# Patient Record
Sex: FEMALE | Race: ASIAN | NOT HISPANIC OR LATINO | URBAN - METROPOLITAN AREA
[De-identification: names, ages, dates, MRNs, and addresses within clinical notes are randomized per-mention and may not be internally consistent; named-entity substitution may affect disease eponyms.]

---

## 2023-02-06 ENCOUNTER — INPATIENT (INPATIENT)
Facility: HOSPITAL | Age: 73
LOS: 2 days | Discharge: HOME | End: 2023-02-09
Attending: INTERNAL MEDICINE | Admitting: INTERNAL MEDICINE
Payer: MEDICAID

## 2023-02-06 VITALS
SYSTOLIC BLOOD PRESSURE: 184 MMHG | RESPIRATION RATE: 18 BRPM | DIASTOLIC BLOOD PRESSURE: 78 MMHG | OXYGEN SATURATION: 95 % | TEMPERATURE: 98 F | WEIGHT: 164.91 LBS | HEART RATE: 87 BPM | HEIGHT: 66 IN

## 2023-02-06 PROCEDURE — 99285 EMERGENCY DEPT VISIT HI MDM: CPT

## 2023-02-06 NOTE — ED ADULT TRIAGE NOTE - CHIEF COMPLAINT QUOTE
Encounter Date: 1/16/2020       History     Chief Complaint   Patient presents with    Chest Pain     7-year-old male who has a history of basal cell carcinoma, kidney stones, recent MRA say abscesses to his back which is been ID'd , hypertension, presents emergency room with a history that today while seated at home he began having severe burning epigastric pain [place] to the left lateral aspect of a ventral hernia.  Patient states he just recently got out of the hospital 2 days ago for his MRSA abscesses.  Currently is taking doxycycline.  The patient denies any fever or chills.  He has had some nausea without vomiting. No known history of heart disease.  The patient states that EMS gave him nitroglycerin with no benefit.  He readily admits that the pain is much worse with certain movement as well as deep inspiration.  No history direct trauma changes in his normal physical activity.  Appetite has been normal.        Review of patient's allergies indicates:   Allergen Reactions    Morphine Nausea Only     Ok with nausea med.     Past Medical History:   Diagnosis Date    Arthritis     Asbestos exposure     SOB     Back pain     Basal cell carcinoma     Bulging discs     lumbar    Hypertension     Kidney stone     Prostatitis     Sepsis due to methicillin resistant Staphylococcus aureus (MRSA) 06/2019    Due to large right upper arm abscess    Wears glasses      Past Surgical History:   Procedure Laterality Date    APPENDECTOMY      HAND SURGERY      rt hand    INCISION AND DRAINAGE Bilateral 06/2019    Right upper arm and left upper arm abscesses    JOINT REPLACEMENT      right knee    KNEE LIGAMENT RECONSTRUCTION      left knee    LITHOTRIPSY      TONSILLECTOMY, ADENOIDECTOMY, BILATERAL MYRINGOTOMY AND TUBES      TOTAL KNEE ARTHROPLASTY  1971    rt knee    URETERAL STENT PLACEMENT       Family History   Problem Relation Age of Onset    Cancer Mother     Stroke Father     Heart disease Father      Collagen disease Neg Hx     Melanoma Neg Hx     Psoriasis Neg Hx     Lupus Neg Hx     Eczema Neg Hx      Social History     Tobacco Use    Smoking status: Never Smoker    Smokeless tobacco: Never Used   Substance Use Topics    Alcohol use: No    Drug use: No     Review of Systems   Constitutional: Negative for activity change, chills, diaphoresis and fever.   HENT: Negative.  Negative for congestion, ear pain, rhinorrhea, sinus pain and sore throat.    Eyes: Negative.  Negative for pain.   Respiratory: Positive for cough. Negative for chest tightness, shortness of breath and wheezing.    Cardiovascular: Negative for chest pain, palpitations and leg swelling.   Gastrointestinal: Positive for abdominal pain. Negative for constipation, diarrhea, nausea and vomiting.   Genitourinary: Negative for difficulty urinating, flank pain, frequency and hematuria.   Musculoskeletal: Positive for back pain.   Skin: Positive for wound. Negative for pallor and rash.   Neurological: Negative for headaches.   All other systems reviewed and are negative.      Physical Exam     Initial Vitals [01/16/20 1432]   BP Pulse Resp Temp SpO2   (!) 141/89 99 (!) 24 99.3 °F (37.4 °C) 98 %      MAP       --         Physical Exam    Constitutional: He appears well-developed and well-nourished. He is not diaphoretic. No distress.   Uncomfortable   HENT:   Head: Normocephalic and atraumatic.   Right Ear: External ear normal.   Left Ear: External ear normal.   Nose: Nose normal.   Mouth/Throat: Oropharynx is clear and moist.   Eyes: Conjunctivae and EOM are normal. Pupils are equal, round, and reactive to light. Right eye exhibits no discharge. Left eye exhibits no discharge. No scleral icterus.   Neck: Normal range of motion. Neck supple. No tracheal deviation present. No JVD present.   Cardiovascular: Normal rate, regular rhythm, normal heart sounds and intact distal pulses. Exam reveals no gallop and no friction rub.    No murmur  heard.  Pulmonary/Chest: Breath sounds normal. No respiratory distress. He has no wheezes. He has no rhonchi. He has no rales. He exhibits no tenderness.   Abdominal: Soft. Bowel sounds are normal. He exhibits no distension and no mass. There is tenderness. There is no rebound and no guarding.   The patient has diastasis recti and has point tenderness to palpation over the proximal portion just inferior to the ribs over the left lateral aspect of the hernia.   Genitourinary: Penis normal.   Musculoskeletal: Normal range of motion. He exhibits no edema or tenderness.   Lymphadenopathy:     He has no cervical adenopathy.   Neurological: He is alert and oriented to person, place, and time. He has normal strength and normal reflexes. No cranial nerve deficit or sensory deficit. GCS score is 15. GCS eye subscore is 4. GCS verbal subscore is 5. GCS motor subscore is 6.   Skin: Skin is warm and dry. Capillary refill takes less than 2 seconds. No rash noted. No erythema. No pallor.   Psychiatric: He has a normal mood and affect. His behavior is normal. Judgment and thought content normal.         ED Course   Procedures  Labs Reviewed   CBC W/ AUTO DIFFERENTIAL - Abnormal; Notable for the following components:       Result Value    RBC 4.55 (*)     Hemoglobin 12.2 (*)     Hematocrit 39.5 (*)     Mean Corpuscular Hemoglobin 26.8 (*)     Mean Corpuscular Hemoglobin Conc 30.9 (*)     RDW 15.9 (*)     Immature Granulocytes 1.3 (*)     Gran # (ANC) 8.0 (*)     Immature Grans (Abs) 0.14 (*)     Lymph% 16.9 (*)     All other components within normal limits   COMPREHENSIVE METABOLIC PANEL   B-TYPE NATRIURETIC PEPTIDE   TROPONIN I   PROTIME-INR   LIPASE          Imaging Results          X-Ray Chest AP Portable (In process)                               Attending Attestation:             Attending ED Notes:   A 70-year-old male who has a history of hypertension who is currently being treated for MRSA abscess that been I did on his  back for which he is taking doxycycline, presented with complaints of epigastric pain today.  The patient describes his pain as sharp and burning in nature and worse with respiration and movement.  He had nausea without vomiting.  No fever.  The ED workup shows a mildly elevated lipase of 159, ALT is 77 and AST of 63.  H&H is normal at the 12.2 and 39.5.  White count is normal. The patient was sent for a abdominal and pelvic CT which showed gallbladder wall thickening and distention. No gallstones were identified.  He did also have a left renal stone at the [facility] which is been chronic.  The patient will have a gallbladder ultrasound obtained and a consult placed to Hospital Medicine for admission.  General surgery is also consult, Dr. Jensen.  The patient's EKG showed a sinus rhythm with an incomplete right bundle but no evidence of any acute ischemia or ectopy.    The patient's findings were communicated to with Dr. Jensen agrees that ultrasound is to be obtained and that if the ultrasound does not show stones that HIDA scan is indicated.  At this time were pending consultation from hospital Medicine for admission.                        Clinical Impression:       ICD-10-CM ICD-9-CM   1. Epigastric pain R10.13 789.06   2. Chest pain R07.9 786.50   3. Diastasis recti M62.08 728.84   4. Cholecystitis K81.9 575.10                             Howie Cooper Jr., MD  01/16/20 1727       Howie Cooper Jr., MD  01/16/20 1730       Howie Cooper Jr., MD  01/16/20 1745     Patient c/o SOB x 2 days, worsening over last hour. Patient has dialysis t/th/sat. Access to left arm. Last dialysis Saturday. Pmh htn, hld, cardiac stents. O2-95% in triage. Patient c/o SOB and chest pain x 2 days, worsening over last hour. Patient has dialysis t/th/sat. Access to left arm. Last dialysis Saturday. Pmh htn, hld, cardiac stents. O2-95% in triage.

## 2023-02-07 LAB
ALBUMIN SERPL ELPH-MCNC: 3.4 G/DL — SIGNIFICANT CHANGE UP (ref 3.3–5)
ALP SERPL-CCNC: 108 U/L — SIGNIFICANT CHANGE UP (ref 40–120)
ALT FLD-CCNC: 14 U/L — SIGNIFICANT CHANGE UP (ref 12–78)
ANION GAP SERPL CALC-SCNC: 10 MMOL/L — SIGNIFICANT CHANGE UP (ref 5–17)
APTT BLD: 33 SEC — SIGNIFICANT CHANGE UP (ref 27.5–35.5)
AST SERPL-CCNC: 10 U/L — LOW (ref 15–37)
BASOPHILS # BLD AUTO: 0.05 K/UL — SIGNIFICANT CHANGE UP (ref 0–0.2)
BASOPHILS NFR BLD AUTO: 0.6 % — SIGNIFICANT CHANGE UP (ref 0–2)
BILIRUB SERPL-MCNC: 0.5 MG/DL — SIGNIFICANT CHANGE UP (ref 0.2–1.2)
BUN SERPL-MCNC: 40 MG/DL — HIGH (ref 7–23)
CALCIUM SERPL-MCNC: 8.7 MG/DL — SIGNIFICANT CHANGE UP (ref 8.5–10.1)
CHLORIDE SERPL-SCNC: 103 MMOL/L — SIGNIFICANT CHANGE UP (ref 96–108)
CO2 SERPL-SCNC: 24 MMOL/L — SIGNIFICANT CHANGE UP (ref 22–31)
CREAT SERPL-MCNC: 5.85 MG/DL — HIGH (ref 0.5–1.3)
EGFR: 7 ML/MIN/1.73M2 — LOW
EOSINOPHIL # BLD AUTO: 0.57 K/UL — HIGH (ref 0–0.5)
EOSINOPHIL NFR BLD AUTO: 7 % — HIGH (ref 0–6)
GLUCOSE SERPL-MCNC: 157 MG/DL — HIGH (ref 70–99)
HCT VFR BLD CALC: 30.5 % — LOW (ref 34.5–45)
HGB BLD-MCNC: 9.6 G/DL — LOW (ref 11.5–15.5)
IMM GRANULOCYTES NFR BLD AUTO: 0.2 % — SIGNIFICANT CHANGE UP (ref 0–0.9)
INR BLD: 0.99 RATIO — SIGNIFICANT CHANGE UP (ref 0.88–1.16)
LYMPHOCYTES # BLD AUTO: 1.19 K/UL — SIGNIFICANT CHANGE UP (ref 1–3.3)
LYMPHOCYTES # BLD AUTO: 14.7 % — SIGNIFICANT CHANGE UP (ref 13–44)
MCHC RBC-ENTMCNC: 27.7 PG — SIGNIFICANT CHANGE UP (ref 27–34)
MCHC RBC-ENTMCNC: 31.5 G/DL — LOW (ref 32–36)
MCV RBC AUTO: 88.2 FL — SIGNIFICANT CHANGE UP (ref 80–100)
MONOCYTES # BLD AUTO: 0.61 K/UL — SIGNIFICANT CHANGE UP (ref 0–0.9)
MONOCYTES NFR BLD AUTO: 7.5 % — SIGNIFICANT CHANGE UP (ref 2–14)
NEUTROPHILS # BLD AUTO: 5.67 K/UL — SIGNIFICANT CHANGE UP (ref 1.8–7.4)
NEUTROPHILS NFR BLD AUTO: 70 % — SIGNIFICANT CHANGE UP (ref 43–77)
NRBC # BLD: 0 /100 WBCS — SIGNIFICANT CHANGE UP (ref 0–0)
NT-PROBNP SERPL-SCNC: 7438 PG/ML — HIGH (ref 0–125)
PLATELET # BLD AUTO: 198 K/UL — SIGNIFICANT CHANGE UP (ref 150–400)
POTASSIUM SERPL-MCNC: 4.6 MMOL/L — SIGNIFICANT CHANGE UP (ref 3.5–5.3)
POTASSIUM SERPL-SCNC: 4.6 MMOL/L — SIGNIFICANT CHANGE UP (ref 3.5–5.3)
PROT SERPL-MCNC: 7 GM/DL — SIGNIFICANT CHANGE UP (ref 6–8.3)
PROTHROM AB SERPL-ACNC: 11.8 SEC — SIGNIFICANT CHANGE UP (ref 10.5–13.4)
RAPID RVP RESULT: SIGNIFICANT CHANGE UP
RBC # BLD: 3.46 M/UL — LOW (ref 3.8–5.2)
RBC # FLD: 13.9 % — SIGNIFICANT CHANGE UP (ref 10.3–14.5)
SARS-COV-2 RNA SPEC QL NAA+PROBE: SIGNIFICANT CHANGE UP
SODIUM SERPL-SCNC: 137 MMOL/L — SIGNIFICANT CHANGE UP (ref 135–145)
TROPONIN I, HIGH SENSITIVITY RESULT: 23.8 NG/L — SIGNIFICANT CHANGE UP
WBC # BLD: 8.11 K/UL — SIGNIFICANT CHANGE UP (ref 3.8–10.5)
WBC # FLD AUTO: 8.11 K/UL — SIGNIFICANT CHANGE UP (ref 3.8–10.5)

## 2023-02-07 PROCEDURE — 93010 ELECTROCARDIOGRAM REPORT: CPT

## 2023-02-07 PROCEDURE — 71045 X-RAY EXAM CHEST 1 VIEW: CPT | Mod: 26

## 2023-02-07 RX ORDER — ACETAMINOPHEN 500 MG
650 TABLET ORAL EVERY 6 HOURS
Refills: 0 | Status: DISCONTINUED | OUTPATIENT
Start: 2023-02-07 | End: 2023-02-09

## 2023-02-07 RX ORDER — LANOLIN ALCOHOL/MO/W.PET/CERES
3 CREAM (GRAM) TOPICAL AT BEDTIME
Refills: 0 | Status: DISCONTINUED | OUTPATIENT
Start: 2023-02-07 | End: 2023-02-09

## 2023-02-07 RX ORDER — ATORVASTATIN CALCIUM 80 MG/1
20 TABLET, FILM COATED ORAL AT BEDTIME
Refills: 0 | Status: DISCONTINUED | OUTPATIENT
Start: 2023-02-07 | End: 2023-02-09

## 2023-02-07 RX ORDER — NITROGLYCERIN 6.5 MG
0.4 CAPSULE, EXTENDED RELEASE ORAL ONCE
Refills: 0 | Status: COMPLETED | OUTPATIENT
Start: 2023-02-07 | End: 2023-02-07

## 2023-02-07 RX ORDER — SEVELAMER CARBONATE 2400 MG/1
2400 POWDER, FOR SUSPENSION ORAL THREE TIMES A DAY
Refills: 0 | Status: DISCONTINUED | OUTPATIENT
Start: 2023-02-07 | End: 2023-02-09

## 2023-02-07 RX ORDER — ONDANSETRON 8 MG/1
4 TABLET, FILM COATED ORAL EVERY 8 HOURS
Refills: 0 | Status: DISCONTINUED | OUTPATIENT
Start: 2023-02-07 | End: 2023-02-09

## 2023-02-07 RX ORDER — NIFEDIPINE 30 MG
60 TABLET, EXTENDED RELEASE 24 HR ORAL DAILY
Refills: 0 | Status: DISCONTINUED | OUTPATIENT
Start: 2023-02-07 | End: 2023-02-09

## 2023-02-07 RX ORDER — HYDRALAZINE HCL 50 MG
50 TABLET ORAL THREE TIMES A DAY
Refills: 0 | Status: DISCONTINUED | OUTPATIENT
Start: 2023-02-07 | End: 2023-02-09

## 2023-02-07 RX ORDER — CLOPIDOGREL BISULFATE 75 MG/1
75 TABLET, FILM COATED ORAL DAILY
Refills: 0 | Status: DISCONTINUED | OUTPATIENT
Start: 2023-02-07 | End: 2023-02-09

## 2023-02-07 RX ADMIN — Medication 50 MILLIGRAM(S): at 13:47

## 2023-02-07 RX ADMIN — Medication 0.4 MILLIGRAM(S): at 02:29

## 2023-02-07 RX ADMIN — SEVELAMER CARBONATE 2400 MILLIGRAM(S): 2400 POWDER, FOR SUSPENSION ORAL at 13:46

## 2023-02-07 RX ADMIN — Medication 50 MILLIGRAM(S): at 22:20

## 2023-02-07 RX ADMIN — Medication 60 MILLIGRAM(S): at 12:49

## 2023-02-07 RX ADMIN — Medication 650 MILLIGRAM(S): at 14:00

## 2023-02-07 RX ADMIN — ATORVASTATIN CALCIUM 20 MILLIGRAM(S): 80 TABLET, FILM COATED ORAL at 22:20

## 2023-02-07 RX ADMIN — SEVELAMER CARBONATE 2400 MILLIGRAM(S): 2400 POWDER, FOR SUSPENSION ORAL at 22:20

## 2023-02-07 RX ADMIN — Medication 650 MILLIGRAM(S): at 13:44

## 2023-02-07 RX ADMIN — CLOPIDOGREL BISULFATE 75 MILLIGRAM(S): 75 TABLET, FILM COATED ORAL at 12:49

## 2023-02-07 RX ADMIN — Medication 650 MILLIGRAM(S): at 07:12

## 2023-02-07 NOTE — H&P ADULT - ASSESSMENT
Patient is 72 yr [facility] with PMH as above came with sob:    ESRD on missed HD session -  Nephrology consult pending for dialysis  Pt gets 3 times per week hemodialysis  Avoid nephrotoxic agents  Monitor lites potassium, calcium and magnesium  Renvela 2400mg three times daily    Benign essential hypertension -  Nifedipine er 60 mg daily, Hydralazine 50mg three times daily  Low salt, renal diet as tolerated    Hyperlipidemia -  Lipitor 20mg at bedtie    GI/DVT prophylaxis     Full code

## 2023-02-07 NOTE — CONSULT NOTE ADULT - SUBJECTIVE AND OBJECTIVE BOX
Patient chart reviewed, full consult to follow.   Will arrange for HD today    Discussed with son, patient resides at [facility].    Thank you for the courtesy of this consultation.   [facility] NEPHROLOGY [facility], [facility]  NEPHROLOGY AND HYPERTENSION  [address]  [address]  [place] [address]  [phone]    MD ELIDA JAMES MD YELENA ROSENBERG, MD BINNY KOSHY, MD CHRISTOPHER CAPUTO, MD EDWARD BOVER, MD      Information from chart:  "Patient is a 72y old  Female who presents with a chief complaint of Shortness of breath (07 Feb 2023 14:13)    HPI:  Patient is 72 yr old [place] female came with sob. She was having worsening sob that started yesterday and got worse, she gets her hemodialysis 3 times a week.  She missed her dialysis and is now worsening symptoms.  She denies chest pain, headache, abdominal pain, nausea, vomiting, fever, sick contacts or recent travel.  She was to date with covid and flu shots.   Vitals, labs and radiology reports reviewed. She was started on meds and will be admitted for further care. (07 Feb 2023 07:44)   "    Son at bedside; patient sitting in bed; mil d respiratory distress  No cough   On 3L NC;   AVF LUE recent use     PAST MEDICAL & SURGICAL HISTORY:    FAMILY HISTORY:    Allergies    No Known Allergies    Intolerances      Home Medications:    MEDICATIONS  (STANDING):  atorvastatin 20 milliGRAM(s) Oral at bedtime  clopidogrel Tablet 75 milliGRAM(s) Oral daily  hydrALAZINE 50 milliGRAM(s) Oral three times a day  NIFEdipine XL 60 milliGRAM(s) Oral daily  sevelamer carbonate 2400 milliGRAM(s) Oral three times a day    MEDICATIONS  (PRN):  acetaminophen     Tablet .. 650 milliGRAM(s) Oral every 6 hours PRN Temp greater or equal to 38C (100.4F), Mild Pain (1 - 3)  aluminum hydroxide/magnesium hydroxide/simethicone Suspension 30 milliLiter(s) Oral every 4 hours PRN Dyspepsia  melatonin 3 milliGRAM(s) Oral at bedtime PRN Insomnia  ondansetron Injectable 4 milliGRAM(s) IV Push every 8 hours PRN Nausea and/or Vomiting    Vital Signs Last 24 Hrs  T(C): 36.4 (07 Feb 2023 13:49), Max: 36.7 (06 Feb 2023 23:08)  T(F): 97.6 (07 Feb 2023 13:49), Max: 98 (06 Feb 2023 23:08)  HR: 84 (07 Feb 2023 13:49) (75 - 87)  BP: 195/85 (07 Feb 2023 13:49) (162/69 - 195/85)  BP(mean): --  RR: 18 (07 Feb 2023 13:49) (17 - 24)  SpO2: 97% (07 Feb 2023 13:49) (91% - 97%)    Parameters below as of 07 Feb 2023 13:49  Patient On (Oxygen Delivery Method): nasal cannula  O2 Flow (L/min): 3      Daily Height in cm: 167.64 (06 Feb 2023 23:08)    Daily     CAPILLARY BLOOD GLUCOSE        PHYSICAL EXAM:      T(C): 36.4 (02-07-23 @ 13:49), Max: 36.7 (02-06-23 @ 23:08)  HR: 84 (02-07-23 @ 13:49) (75 - 87)  BP: 195/85 (02-07-23 @ 13:49) (162/69 - 195/85)  RR: 18 (02-07-23 @ 13:49) (17 - 24)  SpO2: 97% (02-07-23 @ 13:49) (91% - 97%)  Wt(kg): --  Lungs bliateral scattered crackles  Heart S1S2  Abd soft NT ND  Extremities:   tr edema  LUE avf + bruit and thrill               02-07    137  |  103  |  40<H>  ----------------------------<  157<H>  4.6   |  24  |  5.85<H>    Ca    8.7      07 Feb 2023 00:19    TPro  7.0  /  Alb  3.4  /  TBili  0.5  /  DBili  x   /  AST  10<L>  /  ALT  14  /  AlkPhos  108  02-07                          9.6    8.11  )-----------( 198      ( 07 Feb 2023 00:19 )             30.5     Creatinine Trend: 5.85<--          Assessment   ESRD; suspected fluid overload;       Plan  Hemodialysis today  UF as tolerated   Follow echo;   Further recommendations pending course      Brigido Guidry MD

## 2023-02-07 NOTE — ED PROVIDER NOTE - CLINICAL SUMMARY MEDICAL DECISION MAKING FREE TEXT BOX
Pt with Hx of esrd tts, dm htn p/w dyspnea, most likely secondary to fluid overload ; needing HD. DDX: infectious, pna, pe. Presentation is not consistent with acute cardiac etiologies (including but not limited to ACS ( , CHF exacerbation, pericardial effusion/cardiac tamponade), acute respiratory etiologies (including acute pulmonary embolism (Wells Score low risk), pneumothorax, asthma exacerbation, COPD exacerbation), allergic etiologies, infectious etiologies (including sepsis, pneumonia), or non-cardiopulmonary causes (including neurological causes such as demyelinating diseases, metabolic etiologies (including acidemia/electrolyte derrangements), and toxicological causes (including salicylate toxicity, massive overdose)  PLAN:   - Supplemental oxygen as needed, NIPPV as needed. Close hemodynamic monitoring.  - CXR, CBC/CMP, troponin, BNP   -  cxr with mild-mod pvcs  - Serial reassessments, disposition accordingly

## 2023-02-07 NOTE — ED ADULT NURSE NOTE - OBJECTIVE STATEMENT
Pt AOx4 and ambulatory without assistive device according to son at bedside. Pt c/o SOB and chest pain that has been present for past two days. Pt noted to have dialysis catheter in place on right chest wall, fistula on LUE, pink band in place. Dialysis days Tuesday, Thursday, and Saturday. Pt denies fever/chills, sneezing, coughing, dizziness, blurred vision, N/V/D, or dysuria. PMH HTN. Covering for primary RN Jesús. Pt AOx4 and ambulatory without assistive device according to son at bedside. Pt c/o SOB and chest pain that has been present for past two days. Pt noted to have dialysis catheter in place on right chest wall, fistula on LUE, pink band in place. Dialysis days Tuesday, Thursday, and Saturday. Pt denies fever/chills, sneezing, coughing, dizziness, blurred vision, N/V/D, or dysuria. PMH HTN.

## 2023-02-07 NOTE — H&P ADULT - NSHPREVIEWOFSYSTEMS_GEN_ALL_CORE
REVIEW OF SYSTEMS:    CONSTITUTIONAL: No weakness, fevers or chills  EYES/ENT: No visual changes;  No vertigo or throat pain   NECK: No pain or stiffness  RESPIRATORY: No cough, wheezing, hemoptysis; present shortness of breath  CARDIOVASCULAR: No chest pain or palpitations  GASTROINTESTINAL: No abdominal or epigastric pain. No nausea, vomiting, or hematemesis; No diarrhea or constipation. No melena or hematochezia.  GENITOURINARY: No dysuria, frequency or hematuria  PSYCH: normal affect and interaction  EXTREMITIES: no extremities weakness or rashes, joint pain  NEUROLOGICAL: No numbness or weakness  SKIN: No itching, burning, rashes, or lesions   All other review of systems is negative unless indicated above.

## 2023-02-07 NOTE — ED PROVIDER NOTE - PHYSICAL EXAMINATION
VITAL SIGNS: I have reviewed nursing notes and confirm.   GEN: Well-developed; well-nourished; in no acute distress. Speaking full sentences.  SKIN: Warm, pink, no rash, no diaphoresis, no cyanosis, well perfused.   HEAD: Normocephalic; atraumatic. No scalp lacerations, no abrasions.  NECK: Supple; non tender.   EYES: Pupils 3mm equal, round, reactive to light and accomodation, conjunctiva and sclera clear. Extra-ocular movements intact bilaterally.  ENT: No nasal discharge; airway clear. Trachea is midline. ORAL: No oropharyngeal exudates or erythema. Normal dentition.  CV: Regular rate and rhythm. S1, S2 normal; no murmurs, gallops, or rubs. No lower extremity pitting edema bilaterally. Capillary refill < 2 seconds throughout. Distal pulses intact 2+ throughout.  RESP: CTA bilaterally.b/l rales mild, no rhonchi or wheezing.  ABD: Normal bowel sounds, soft, non-distended, non-tender, no rebound, no guarding, no rigidity, no hepatosplenomegaly. No CVA tenderness bilaterally.  MSK: Normal range of motion and movement of all 4 extremities. No joint or muscular pain throughout. No clubbing.   BACK: No thoracolumbar midline or paravertebral tenderness. No step-offs or obvious deformities.  NEURO: Alert & oriented x 3, Grossly unremarkable. Sensory and motor intact throughout. No focal deficits.  Normal speech and coordination.   PSYCH: Cooperative, appropriate.

## 2023-02-07 NOTE — H&P ADULT - NSHPLABSRESULTS_GEN_ALL_CORE
.  LABS:                         9.6    8.11  )-----------( 198      ( 07 Feb 2023 00:19 )             30.5     02-07    137  |  103  |  40<H>  ----------------------------<  157<H>  4.6   |  24  |  5.85<H>    Ca    8.7      07 Feb 2023 00:19    TPro  7.0  /  Alb  3.4  /  TBili  0.5  /  DBili  x   /  AST  10<L>  /  ALT  14  /  AlkPhos  108  02-07    PT/INR - ( 07 Feb 2023 00:19 )   PT: 11.8 sec;   INR: 0.99 ratio         PTT - ( 07 Feb 2023 00:19 )  PTT:33.0 sec        Serum Pro-Brain Natriuretic Peptide: 7438 pg/mL (02-07 @ 00:19)        RADIOLOGY, EKG & ADDITIONAL TESTS: Reviewed.

## 2023-02-07 NOTE — H&P ADULT - HISTORY OF PRESENT ILLNESS
Patient is 72 yr old [facility] female came with sob. She was having worsening sob that started yesterday and got worse, she gets her hemodialysis 3 times a week.  She missed her dialysis and is now worsening symptoms.  She denies chest pain, headache, abdominal pain, nausea, vomiting, fever, sick contacts or recent travel.  She was to date with covid and flu shots.   Vitals, labs and radiology reports reviewed. She was started on meds and will be admitted for further care.

## 2023-02-07 NOTE — ED PROVIDER NOTE - OBJECTIVE STATEMENT
72F PMHx of ESRD TTS (last Sat HD), HTN, CAD stenting, lemuel speaking presenting with shortness of breath, x few days. Otherwise, mild chest pain, nonradiating, nonexertional.     ROS: Otherwise, (-) known family history of early AMI in first degree relative < 56 y/o, (-) tearing or ripping quality, (-) diaphoresis, (-) exertional component, (-) pleuritic component, (-) positional component, (-) abdominal pain, (-) nausea/vomiting, (-) fevers/chills, (-) recent illness, (-) traumatic injury,  (+) shortness of breath, (-) coughing, (-) prior cardiac history, (-) tobacco, (-) IVDU or cocaine use.

## 2023-02-08 LAB
HCV AB S/CO SERPL IA: 0.12 S/CO — SIGNIFICANT CHANGE UP (ref 0–0.99)
HCV AB SERPL-IMP: SIGNIFICANT CHANGE UP

## 2023-02-08 PROCEDURE — 99232 SBSQ HOSP IP/OBS MODERATE 35: CPT

## 2023-02-08 PROCEDURE — 93306 TTE W/DOPPLER COMPLETE: CPT | Mod: 26

## 2023-02-08 RX ADMIN — Medication 50 MILLIGRAM(S): at 21:49

## 2023-02-08 RX ADMIN — SEVELAMER CARBONATE 2400 MILLIGRAM(S): 2400 POWDER, FOR SUSPENSION ORAL at 21:50

## 2023-02-08 RX ADMIN — ATORVASTATIN CALCIUM 20 MILLIGRAM(S): 80 TABLET, FILM COATED ORAL at 21:49

## 2023-02-08 RX ADMIN — Medication 50 MILLIGRAM(S): at 14:04

## 2023-02-08 RX ADMIN — CLOPIDOGREL BISULFATE 75 MILLIGRAM(S): 75 TABLET, FILM COATED ORAL at 11:08

## 2023-02-08 RX ADMIN — SEVELAMER CARBONATE 2400 MILLIGRAM(S): 2400 POWDER, FOR SUSPENSION ORAL at 13:57

## 2023-02-08 RX ADMIN — Medication 60 MILLIGRAM(S): at 10:33

## 2023-02-08 RX ADMIN — Medication 650 MILLIGRAM(S): at 10:29

## 2023-02-08 NOTE — PHYSICAL THERAPY INITIAL EVALUATION ADULT - MODALITIES TREATMENT COMMENTS
Patient reported that thi sis her baseline gait ability especially since she started dialysis . Prior to dialysis her gait was better. Discussed Home PT for improving gait and balance , pt in agreement Patient reported that this is her baseline gait ability especially since she started dialysis . Prior to dialysis her gait was better. Discussed Home PT for improving gait and balance , pt in agreement

## 2023-02-08 NOTE — PROGRESS NOTE ADULT - ASSESSMENT
Patient is 72 yr [facility] F with PMH as above came with sob:    INTP [id number]    ESRD on missed HD session -  Nephrology consult s/p hd yest  Pt gets 3 times per week hemodialysis  Avoid nephrotoxic agents  Monitor lites potassium, calcium and magnesium  Renvela 2400mg three times daily  will get echo, sob improved    PT eval     Benign essential hypertension -  Nifedipine er 60 mg daily, Hydralazine 50mg three times daily  Low salt, renal diet as tolerated    Hyperlipidemia -  Lipitor 20mg at bedtie    GI/DVT prophylaxis     Full code 4 weeks

## 2023-02-08 NOTE — PHYSICAL THERAPY INITIAL EVALUATION ADULT - PERTINENT HX OF CURRENT PROBLEM, REHAB EVAL
Admitted for SOB  Therapist monitored O2 sats  throughout the session- see vitals flow sheet for details

## 2023-02-08 NOTE — PHYSICAL THERAPY INITIAL EVALUATION ADULT - GAIT TRAINING, PT EVAL
Pt will be able to ambulate using assistive device up to 100 ft or more, safely observing proper gait, posture and prevent falls.

## 2023-02-08 NOTE — PHYSICAL THERAPY INITIAL EVALUATION ADULT - GENERAL OBSERVATIONS, REHAB EVAL
Pt recd supine NAD. No c/o pain. Reported generalized weakness and SOB with activities and rest. [place] that pt had similar episode 2 years ago.  She began dialysis 5 months ago.

## 2023-02-08 NOTE — PHYSICAL THERAPY INITIAL EVALUATION ADULT - ADDITIONAL COMMENTS
Lives in a  with 2 steps to enter c no HR. Has a mail box that she uses for support as needed. Pt does not go outside home alone. A family member provides manual assistance to negotiate stairs . Pt does not wear diapers. She can be left alone for 3-4 hours. Pt has numerous  family members in [place] - Family takes her in wheelchair to family events. Pt mostly sits in chair when visiting family.   Pt reports her walking balance has declined since the start of dialysis

## 2023-02-08 NOTE — PHYSICAL THERAPY INITIAL EVALUATION ADULT - AMBULATION SKILLS, REHAB EVAL
Does not own a SAC or RW. Ambulates in home with  help of fixtures 20-230ft. Outside home hollds on to family member's arm to walk  100-150ft  once a month  generally to visit temple. Otehrwise stays home unless going out to dialysis  via wheelchair/needed assist/needs device Does not own a SAC or RW. Ambulates in home with  help of fixtures 20-30ft. Outside home holds on to family member's arm to walk  100-150ft  [facility] a month  [facility] Otherwise stays home unless going out to dialysis  via wheelchair/needed assist/needs device

## 2023-02-08 NOTE — PHYSICAL THERAPY INITIAL EVALUATION ADULT - GAIT DISTANCE, PT EVAL
ambulated 35ft with RW . Furtehr ambulation limited by  SIMPSON 71/0 . Pt rested for approx  5 min in sitt and SIMPSON improved to 4/10

## 2023-02-09 VITALS
HEART RATE: 92 BPM | OXYGEN SATURATION: 95 % | TEMPERATURE: 98 F | DIASTOLIC BLOOD PRESSURE: 67 MMHG | RESPIRATION RATE: 18 BRPM | SYSTOLIC BLOOD PRESSURE: 146 MMHG

## 2023-02-09 PROCEDURE — 99239 HOSP IP/OBS DSCHRG MGMT >30: CPT

## 2023-02-09 RX ORDER — SEVELAMER CARBONATE 2400 MG/1
3 POWDER, FOR SUSPENSION ORAL
Qty: 0 | Refills: 0 | DISCHARGE
Start: 2023-02-09

## 2023-02-09 RX ORDER — CLOPIDOGREL BISULFATE 75 MG/1
1 TABLET, FILM COATED ORAL
Qty: 0 | Refills: 0 | DISCHARGE
Start: 2023-02-09

## 2023-02-09 RX ORDER — HYDRALAZINE HCL 50 MG
1 TABLET ORAL
Qty: 0 | Refills: 0 | DISCHARGE
Start: 2023-02-09

## 2023-02-09 RX ORDER — NIFEDIPINE 30 MG
1 TABLET, EXTENDED RELEASE 24 HR ORAL
Qty: 0 | Refills: 0 | DISCHARGE
Start: 2023-02-09

## 2023-02-09 RX ORDER — ATORVASTATIN CALCIUM 80 MG/1
1 TABLET, FILM COATED ORAL
Qty: 0 | Refills: 0 | DISCHARGE
Start: 2023-02-09

## 2023-02-09 RX ADMIN — Medication 50 MILLIGRAM(S): at 16:09

## 2023-02-09 RX ADMIN — Medication 650 MILLIGRAM(S): at 16:10

## 2023-02-09 RX ADMIN — SEVELAMER CARBONATE 2400 MILLIGRAM(S): 2400 POWDER, FOR SUSPENSION ORAL at 05:14

## 2023-02-09 RX ADMIN — CLOPIDOGREL BISULFATE 75 MILLIGRAM(S): 75 TABLET, FILM COATED ORAL at 16:09

## 2023-02-09 RX ADMIN — Medication 60 MILLIGRAM(S): at 05:15

## 2023-02-09 RX ADMIN — Medication 50 MILLIGRAM(S): at 05:15

## 2023-02-09 NOTE — DISCHARGE NOTE NURSING/CASE MANAGEMENT/SOCIAL WORK - PATIENT PORTAL LINK FT
You can access the FollowMyHealth Patient Portal offered by [facility] by registering at the following website: [email or web address]. By joining [facility]’s FollowMyHealth portal, you will also be able to view your health information using other applications (apps) compatible with our system.

## 2023-02-09 NOTE — PROGRESS NOTE ADULT - SUBJECTIVE AND OBJECTIVE BOX
Patient is a 72y old  Female who presents with a chief complaint of Shortness of breath (2023 14:13)    INTERVAL HPI/OVERNIGHT EVENTS:    MEDICATIONS  (STANDING):  atorvastatin 20 milliGRAM(s) Oral at bedtime  clopidogrel Tablet 75 milliGRAM(s) Oral daily  hydrALAZINE 50 milliGRAM(s) Oral three times a day  NIFEdipine XL 60 milliGRAM(s) Oral daily  sevelamer carbonate 2400 milliGRAM(s) Oral three times a day    MEDICATIONS  (PRN):  acetaminophen     Tablet .. 650 milliGRAM(s) Oral every 6 hours PRN Temp greater or equal to 38C (100.4F), Mild Pain (1 - 3)  aluminum hydroxide/magnesium hydroxide/simethicone Suspension 30 milliLiter(s) Oral every 4 hours PRN Dyspepsia  melatonin 3 milliGRAM(s) Oral at bedtime PRN Insomnia  ondansetron Injectable 4 milliGRAM(s) IV Push every 8 hours PRN Nausea and/or Vomiting    Allergies    No Known Allergies    Intolerances      REVIEW OF SYSTEMS:  All other systems reviewed and are negative    Vital Signs Last 24 Hrs  T(C): 36.6 (2023 10:40), Max: 36.9 (2023 05:12)  T(F): 97.9 (2023 10:40), Max: 98.4 (2023 05:12)  HR: 80 (2023 10:40) (80 - 88)  BP: 173/69 (2023 10:40) (118/59 - 195/85)  BP(mean): --  RR: 17 (2023 10:40) (17 - 20)  SpO2: 98% (2023 10:40) (96% - 100%)    Parameters below as of 2023 10:40  Patient On (Oxygen Delivery Method): nasal cannula,3lit      Daily     Daily Weight in k.7 (2023 19:36)  I&O's Summary    2023 07:01  -  2023 07:00  --------------------------------------------------------  IN: 0 mL / OUT: 2500 mL / NET: -2500 mL      CAPILLARY BLOOD GLUCOSE        PHYSICAL EXAM:  GENERAL: NAD,    HEAD:  Atraumatic, Normocephalic  EYES: EOMI, PERRLA, conjunctiva and sclera clear  ENMT: No tonsillar erythema, exudates, or enlargement; Moist mucous membranes, Good dentition, No lesions  NECK: Supple, No JVD, Normal thyroid  NERVOUS SYSTEM:  Alert & Oriented X3, Good concentration; Motor Strength 5/5 B/L upper and lower extremities; DTRs 2+ intact and symmetric  CHEST/LUNG: Clear to percussion bilaterally; No rales, rhonchi, wheezing, or rubs  HEART: Regular rate and rhythm; No murmurs, rubs, or gallops  ABDOMEN: Soft, Nontender, Nondistended; Bowel sounds present  EXTREMITIES:  2+ Peripheral Pulses, No clubbing, cyanosis, or edema  LYMPH: No lymphadenopathy noted  SKIN: No rashes or lesions    Labs                          9.6    8.11  )-----------( 198      ( 2023 00:19 )             30.5     02-    137  |  103  |  40<H>  ----------------------------<  157<H>  4.6   |  24  |  5.85<H>    Ca    8.7      2023 00:19    TPro  7.0  /  Alb  3.4  /  TBili  0.5  /  DBili  x   /  AST  10<L>  /  ALT  14  /  AlkPhos  108  -    PT/INR - ( 2023 00:19 )   PT: 11.8 sec;   INR: 0.99 ratio         PTT - ( 2023 00:19 )  PTT:33.0 sec                    DVT prophylaxis: > Lovenox 40mg SQ daily  > Heparin   > SCD's
NEPHROLOGY PROGRESS NOTE    CHIEF COMPLAINT:  ESRD    HPI:  Seen on dialysis.  AV access working well.  She is c/o dyspnea at rest.     EXAM:  T(F): 97.9 (02-09-23 @ 09:35)  HR: 88 (02-09-23 @ 09:35)  BP: 154/66 (02-09-23 @ 09:35)  RR: 18 (02-09-23 @ 09:35)  SpO2: 99% (02-09-23 @ 09:35)    Conversant, in no apparent distress  Mild increase in respiratory effort;  inspiratory crackles and expiratory rhonhci  Heart RRR with no murmur, no peripheral edema       RADIOLOGY  Chest X-ray personally reviewed and shows diffuse interstitial markings    CARDIOLOGY  ECHO shows EF 55-60, enlarged LA, no severe valve disease      ASSESSMENT:  1.  ESRD on hemodialysis  2.  Acute/chronic diastolic heart failure    PLAN:  Complete HD as ordered today and monitor clinical response to fluid removal  
[facility] NEPHROLOGY SERVICES, [facility]  NEPHROLOGY AND HYPERTENSION  [address]  [address]  [place] [address]  [phone]    MD ELIDA JAMES, MD SP NEGRON, MD AUNG ROBERTS, MD JOS SHEPHERD, MD RONY NUNEZ MD          Patient events noted  NO distress    MEDICATIONS  (STANDING):  atorvastatin 20 milliGRAM(s) Oral at bedtime  clopidogrel Tablet 75 milliGRAM(s) Oral daily  hydrALAZINE 50 milliGRAM(s) Oral three times a day  NIFEdipine XL 60 milliGRAM(s) Oral daily  sevelamer carbonate 2400 milliGRAM(s) Oral three times a day    MEDICATIONS  (PRN):  acetaminophen     Tablet .. 650 milliGRAM(s) Oral every 6 hours PRN Temp greater or equal to 38C (100.4F), Mild Pain (1 - 3)  aluminum hydroxide/magnesium hydroxide/simethicone Suspension 30 milliLiter(s) Oral every 4 hours PRN Dyspepsia  melatonin 3 milliGRAM(s) Oral at bedtime PRN Insomnia  ondansetron Injectable 4 milliGRAM(s) IV Push every 8 hours PRN Nausea and/or Vomiting      02-07-23 @ 07:01  -  02-08-23 @ 07:00  --------------------------------------------------------  IN: 0 mL / OUT: 2500 mL / NET: -2500 mL      PHYSICAL EXAM:      T(C): 36.6 (02-08-23 @ 17:37), Max: 36.9 (02-08-23 @ 05:12)  HR: 84 (02-08-23 @ 17:37) (80 - 88)  BP: 149/71 (02-08-23 @ 17:37) (118/59 - 173/69)  RR: 16 (02-08-23 @ 17:37) (16 - 20)  SpO2: 98% (02-08-23 @ 17:37) (98% - 100%)  Wt(kg): --  Lungs clear  Heart S1S2  Abd soft NT ND  Extremities:   1 edema                                    9.6    8.11  )-----------( 198      ( 07 Feb 2023 00:19 )             30.5     02-07    137  |  103  |  40<H>  ----------------------------<  157<H>  4.6   |  24  |  5.85<H>    Ca    8.7      07 Feb 2023 00:19    TPro  7.0  /  Alb  3.4  /  TBili  0.5  /  DBili  x   /  AST  10<L>  /  ALT  14  /  AlkPhos  108  02-07      LIVER FUNCTIONS - ( 07 Feb 2023 00:19 )  Alb: 3.4 g/dL / Pro: 7.0 gm/dL / ALK PHOS: 108 U/L / ALT: 14 U/L / AST: 10 U/L / GGT: x           Creatinine Trend: 5.85<--        Assessment   ESRD; suspected fluid overload;   Improved with HD yesterday    Plan  Hemodialysis for tomorrow  Follow echo;   Further recommendations pending course        Brigido Guidry MD

## 2023-02-09 NOTE — DISCHARGE NOTE PROVIDER - HOSPITAL COURSE
72 yr [facility] F with PMH as above came with sob:         ESRD on missed HD session -  Nephrology consult s/p hd today, doing well   Pt gets 3 times per week hemodialysis  Avoid nephrotoxic agents  Monitor lites potassium, calcium and magnesium  Renvela 2400mg three times daily   echo stable , sob improved    PT eval : Home PT     Benign essential hypertension -  Nifedipine er 60 mg daily, Hydralazine 50mg three times daily  Low salt, renal diet as tolerated    Hyperlipidemia -  Lipitor 20mg at bedtime        pt seen and examined 45 min spent on dc planning     Lab test review, Radiology Review, Vitals review, Consultant review and discussion, Physical examination, IDR, Assessment and plan; Plan discussion with patient and family

## 2023-02-09 NOTE — DISCHARGE NOTE PROVIDER - NSDCMRMEDTOKEN_GEN_ALL_CORE_FT
atorvastatin 20 mg oral tablet: 1 tab(s) orally once a day (at bedtime)  clopidogrel 75 mg oral tablet: 1 tab(s) orally once a day  hydrALAZINE 50 mg oral tablet: 1 tab(s) orally 3 times a day  NIFEdipine 60 mg oral tablet, extended release: 1 tab(s) orally once a day  sevelamer carbonate 800 mg oral tablet: 3 tab(s) orally 3 times a day

## 2023-02-09 NOTE — DISCHARGE NOTE NURSING/CASE MANAGEMENT/SOCIAL WORK - NSDCPEFALRISK_GEN_ALL_CORE
For information on Fall & Injury Prevention, visit: [email or web address] OR  [email or web address] OR  https://www.cdc.gov/steadi/patient.html

## 2023-02-09 NOTE — DISCHARGE NOTE PROVIDER - NSDCCPCAREPLAN_GEN_ALL_CORE_FT
PRINCIPAL DISCHARGE DIAGNOSIS  Diagnosis: ESRD needing dialysis  Assessment and Plan of Treatment: continue with your dialysis

## 2023-02-14 DIAGNOSIS — I12.0 HYPERTENSIVE CHRONIC KIDNEY DISEASE WITH STAGE 5 CHRONIC KIDNEY DISEASE OR END STAGE RENAL DISEASE: ICD-10-CM

## 2023-02-14 DIAGNOSIS — I25.10 ATHEROSCLEROTIC HEART DISEASE OF NATIVE CORONARY ARTERY WITHOUT ANGINA PECTORIS: ICD-10-CM

## 2023-02-14 DIAGNOSIS — E87.70 FLUID OVERLOAD, UNSPECIFIED: ICD-10-CM

## 2023-02-14 DIAGNOSIS — E78.5 HYPERLIPIDEMIA, UNSPECIFIED: ICD-10-CM

## 2023-02-14 DIAGNOSIS — Z91.15 PATIENT'S NONCOMPLIANCE WITH RENAL DIALYSIS: ICD-10-CM

## 2023-02-14 DIAGNOSIS — Z95.5 PRESENCE OF CORONARY ANGIOPLASTY IMPLANT AND GRAFT: ICD-10-CM

## 2023-02-14 DIAGNOSIS — N18.6 END STAGE RENAL DISEASE: ICD-10-CM

## 2023-06-22 ENCOUNTER — TRANSCRIPTION ENCOUNTER (OUTPATIENT)
Age: 73
End: 2023-06-22

## 2023-06-22 ENCOUNTER — INPATIENT (INPATIENT)
Facility: HOSPITAL | Age: 73
LOS: 0 days | Discharge: HOME | DRG: 246 | End: 2023-06-23
Attending: INTERNAL MEDICINE | Admitting: INTERNAL MEDICINE
Payer: MEDICAID

## 2023-06-22 VITALS
RESPIRATION RATE: 18 BRPM | HEART RATE: 76 BPM | SYSTOLIC BLOOD PRESSURE: 155 MMHG | DIASTOLIC BLOOD PRESSURE: 54 MMHG | OXYGEN SATURATION: 97 % | TEMPERATURE: 98 F

## 2023-06-22 DIAGNOSIS — Z90.49 ACQUIRED ABSENCE OF OTHER SPECIFIED PARTS OF DIGESTIVE TRACT: Chronic | ICD-10-CM

## 2023-06-22 DIAGNOSIS — I77.0 ARTERIOVENOUS FISTULA, ACQUIRED: Chronic | ICD-10-CM

## 2023-06-22 DIAGNOSIS — Z95.5 PRESENCE OF CORONARY ANGIOPLASTY IMPLANT AND GRAFT: Chronic | ICD-10-CM

## 2023-06-22 DIAGNOSIS — I25.10 ATHEROSCLEROTIC HEART DISEASE OF NATIVE CORONARY ARTERY WITHOUT ANGINA PECTORIS: ICD-10-CM

## 2023-06-22 PROCEDURE — 93010 ELECTROCARDIOGRAM REPORT: CPT | Mod: 77

## 2023-06-22 PROCEDURE — 92928 PRQ TCAT PLMT NTRAC ST 1 LES: CPT | Mod: RC

## 2023-06-22 PROCEDURE — 93010 ELECTROCARDIOGRAM REPORT: CPT

## 2023-06-22 PROCEDURE — 99152 MOD SED SAME PHYS/QHP 5/>YRS: CPT

## 2023-06-22 RX ORDER — INSULIN LISPRO 100/ML
VIAL (ML) SUBCUTANEOUS AT BEDTIME
Refills: 0 | Status: DISCONTINUED | OUTPATIENT
Start: 2023-06-22 | End: 2023-06-23

## 2023-06-22 RX ORDER — LABETALOL HCL 100 MG
1 TABLET ORAL
Refills: 0 | DISCHARGE

## 2023-06-22 RX ORDER — ALBUTEROL 90 UG/1
2 AEROSOL, METERED ORAL
Refills: 0 | DISCHARGE

## 2023-06-22 RX ORDER — FENTANYL CITRATE 50 UG/ML
25 INJECTION INTRAVENOUS ONCE
Refills: 0 | Status: DISCONTINUED | OUTPATIENT
Start: 2023-06-22 | End: 2023-06-22

## 2023-06-22 RX ORDER — DEXTROSE 50 % IN WATER 50 %
25 SYRINGE (ML) INTRAVENOUS ONCE
Refills: 0 | Status: DISCONTINUED | OUTPATIENT
Start: 2023-06-22 | End: 2023-06-23

## 2023-06-22 RX ORDER — LABETALOL HCL 100 MG
10 TABLET ORAL ONCE
Refills: 0 | Status: COMPLETED | OUTPATIENT
Start: 2023-06-22 | End: 2023-06-22

## 2023-06-22 RX ORDER — DEXTROSE 50 % IN WATER 50 %
15 SYRINGE (ML) INTRAVENOUS ONCE
Refills: 0 | Status: DISCONTINUED | OUTPATIENT
Start: 2023-06-22 | End: 2023-06-23

## 2023-06-22 RX ORDER — SODIUM CHLORIDE 9 MG/ML
1000 INJECTION, SOLUTION INTRAVENOUS
Refills: 0 | Status: DISCONTINUED | OUTPATIENT
Start: 2023-06-22 | End: 2023-06-23

## 2023-06-22 RX ORDER — SIMETHICONE 80 MG/1
80 TABLET, CHEWABLE ORAL EVERY 4 HOURS
Refills: 0 | Status: DISCONTINUED | OUTPATIENT
Start: 2023-06-22 | End: 2023-06-23

## 2023-06-22 RX ORDER — HYDRALAZINE HCL 50 MG
50 TABLET ORAL THREE TIMES A DAY
Refills: 0 | Status: DISCONTINUED | OUTPATIENT
Start: 2023-06-22 | End: 2023-06-23

## 2023-06-22 RX ORDER — LABETALOL HCL 100 MG
200 TABLET ORAL EVERY 8 HOURS
Refills: 0 | Status: DISCONTINUED | OUTPATIENT
Start: 2023-06-22 | End: 2023-06-23

## 2023-06-22 RX ORDER — ALBUTEROL 90 UG/1
2.5 AEROSOL, METERED ORAL ONCE
Refills: 0 | Status: COMPLETED | OUTPATIENT
Start: 2023-06-22 | End: 2023-06-22

## 2023-06-22 RX ORDER — NIFEDIPINE 30 MG
90 TABLET, EXTENDED RELEASE 24 HR ORAL DAILY
Refills: 0 | Status: DISCONTINUED | OUTPATIENT
Start: 2023-06-22 | End: 2023-06-23

## 2023-06-22 RX ORDER — GLUCAGON INJECTION, SOLUTION 0.5 MG/.1ML
1 INJECTION, SOLUTION SUBCUTANEOUS ONCE
Refills: 0 | Status: DISCONTINUED | OUTPATIENT
Start: 2023-06-22 | End: 2023-06-23

## 2023-06-22 RX ORDER — ISOSORBIDE DINITRATE 5 MG/1
5 TABLET ORAL THREE TIMES A DAY
Refills: 0 | Status: DISCONTINUED | OUTPATIENT
Start: 2023-06-22 | End: 2023-06-23

## 2023-06-22 RX ORDER — SEVELAMER CARBONATE 2400 MG/1
2400 POWDER, FOR SUSPENSION ORAL THREE TIMES A DAY
Refills: 0 | Status: DISCONTINUED | OUTPATIENT
Start: 2023-06-22 | End: 2023-06-23

## 2023-06-22 RX ORDER — ALBUTEROL 90 UG/1
2 AEROSOL, METERED ORAL EVERY 6 HOURS
Refills: 0 | Status: DISCONTINUED | OUTPATIENT
Start: 2023-06-22 | End: 2023-06-23

## 2023-06-22 RX ORDER — ATORVASTATIN CALCIUM 80 MG/1
20 TABLET, FILM COATED ORAL AT BEDTIME
Refills: 0 | Status: DISCONTINUED | OUTPATIENT
Start: 2023-06-22 | End: 2023-06-23

## 2023-06-22 RX ORDER — ASPIRIN/CALCIUM CARB/MAGNESIUM 324 MG
81 TABLET ORAL DAILY
Refills: 0 | Status: DISCONTINUED | OUTPATIENT
Start: 2023-06-22 | End: 2023-06-23

## 2023-06-22 RX ORDER — ISOSORBIDE DINITRATE 5 MG/1
1 TABLET ORAL
Refills: 0 | DISCHARGE

## 2023-06-22 RX ORDER — CLOPIDOGREL BISULFATE 75 MG/1
75 TABLET, FILM COATED ORAL DAILY
Refills: 0 | Status: DISCONTINUED | OUTPATIENT
Start: 2023-06-22 | End: 2023-06-23

## 2023-06-22 RX ORDER — IBUPROFEN 200 MG
600 TABLET ORAL ONCE
Refills: 0 | Status: DISCONTINUED | OUTPATIENT
Start: 2023-06-22 | End: 2023-06-22

## 2023-06-22 RX ORDER — SENNA PLUS 8.6 MG/1
2 TABLET ORAL AT BEDTIME
Refills: 0 | Status: DISCONTINUED | OUTPATIENT
Start: 2023-06-22 | End: 2023-06-23

## 2023-06-22 RX ORDER — INSULIN LISPRO 100/ML
VIAL (ML) SUBCUTANEOUS
Refills: 0 | Status: DISCONTINUED | OUTPATIENT
Start: 2023-06-22 | End: 2023-06-23

## 2023-06-22 RX ORDER — DEXTROSE 50 % IN WATER 50 %
12.5 SYRINGE (ML) INTRAVENOUS ONCE
Refills: 0 | Status: DISCONTINUED | OUTPATIENT
Start: 2023-06-22 | End: 2023-06-23

## 2023-06-22 RX ADMIN — Medication 200 MILLIGRAM(S): at 23:02

## 2023-06-22 RX ADMIN — Medication 81 MILLIGRAM(S): at 12:19

## 2023-06-22 RX ADMIN — ALBUTEROL 2.5 MILLIGRAM(S): 90 AEROSOL, METERED ORAL at 12:32

## 2023-06-22 RX ADMIN — CLOPIDOGREL BISULFATE 75 MILLIGRAM(S): 75 TABLET, FILM COATED ORAL at 12:19

## 2023-06-22 RX ADMIN — Medication 5 MILLIGRAM(S): at 23:01

## 2023-06-22 RX ADMIN — ATORVASTATIN CALCIUM 20 MILLIGRAM(S): 80 TABLET, FILM COATED ORAL at 23:02

## 2023-06-22 RX ADMIN — ISOSORBIDE DINITRATE 5 MILLIGRAM(S): 5 TABLET ORAL at 15:17

## 2023-06-22 RX ADMIN — Medication 200 MILLIGRAM(S): at 15:03

## 2023-06-22 RX ADMIN — Medication 50 MILLIGRAM(S): at 15:05

## 2023-06-22 RX ADMIN — FENTANYL CITRATE 25 MICROGRAM(S): 50 INJECTION INTRAVENOUS at 14:39

## 2023-06-22 RX ADMIN — FENTANYL CITRATE 25 MICROGRAM(S): 50 INJECTION INTRAVENOUS at 23:02

## 2023-06-22 RX ADMIN — Medication 90 MILLIGRAM(S): at 15:17

## 2023-06-22 RX ADMIN — Medication 50 MILLIGRAM(S): at 23:02

## 2023-06-22 RX ADMIN — SENNA PLUS 2 TABLET(S): 8.6 TABLET ORAL at 23:02

## 2023-06-22 RX ADMIN — Medication 10 MILLIGRAM(S): at 14:39

## 2023-06-22 RX ADMIN — SEVELAMER CARBONATE 2400 MILLIGRAM(S): 2400 POWDER, FOR SUSPENSION ORAL at 15:55

## 2023-06-22 RX ADMIN — FENTANYL CITRATE 25 MICROGRAM(S): 50 INJECTION INTRAVENOUS at 14:54

## 2023-06-22 RX ADMIN — FENTANYL CITRATE 25 MICROGRAM(S): 50 INJECTION INTRAVENOUS at 23:17

## 2023-06-22 NOTE — CONSULT NOTE ADULT - CONSULT REQUESTED DATE/TIME
22-Jun-2023 11:44
DM (diabetes mellitus)    HTN (hypertension)    Irregular heart beat    Severe persistent asthma, unspecified whether complicated

## 2023-06-22 NOTE — CHART NOTE - NSCHARTNOTEFT_GEN_A_CORE
Pt s/p RCA stent via RRA with no hematoma/bleeding with +radial/ulna pulses band in place with 8/10 chest pain and SOB post procedure.  Post ECG with new LBBB with .  Ordered Labetalol 10mg IVP and Fentanyl 25mcg reviewed ECG with Dr. Walker.    Pt feeling better after Fentanyl with  pain resolving down to 7/10 with less breathing effort.  2LNC in place.      Plan:  Continue home medications ordered  Monitor tele  Strict BP management  Monitor site and removal of band as per protocol  HD tonight will be set up by Dr. Tunrer  Continue DAPT and Statin  Admit to [facility] overnight with possible d/c home in am if stable   F/U with Dr. Brown Trujillo in 2 weeks at [facility]    EVARISTO Denson-BC  Cardiology

## 2023-06-22 NOTE — CONSULT NOTE ADULT - SUBJECTIVE AND OBJECTIVE BOX
NEPHROLOGY CONSULTATION    CHIEF COMPLAINT: SOB    HPI:  Pt is 74 yo female with pmh of HTN, HLD, DMT2 (AIc unknown), CAD s/p prior stents in [place] ~7 years ago ([facility] note 2019), ESRD on HD via L AVF TTS at [facility] HD center presented to [facility] on 6/20/23 with HTN urgency and SOB unable to lie flat. In 4/2023 she had LVEF 45-50%, Grade II DD, small PFO, moderate AVR, and moderate L pleural effusion. She is s/p HD 6/21/23 and s/p diagnostic cath: LM Normal, pLAD 30%, pLCX, patent dLCX stent, 20-30% stenosis OM1, mRCA 80% stenosis in between patent stents. Adm today to [facility] for PCI to mRCA. Some SOB and unable to lie flat. No CP, N/V/D/C/F/C.    ROS:  as above    Allergies:  No Known Allergies    PAST MEDICAL & SURGICAL HISTORY:  HTN (hypertension)  HLD (hyperlipidemia)  CAD (coronary artery disease)  ESRD on hemodialysis  CHF (congestive heart failure)  GERD (gastroesophageal reflux disease)  Stented coronary artery  AV fistula  S/P cholecystectomy    SOCIAL HISTORY:  negative    FAMILY HISTORY:  NC    MEDICATIONS  (STANDING):  aspirin enteric coated 81 milliGRAM(s) Oral daily  atorvastatin 20 milliGRAM(s) Oral at bedtime  clopidogrel Tablet 75 milliGRAM(s) Oral daily  dextrose 5%. 1000 milliLiter(s) (50 mL/Hr) IV Continuous <Continuous>  dextrose 5%. 1000 milliLiter(s) (100 mL/Hr) IV Continuous <Continuous>  dextrose 50% Injectable 25 Gram(s) IV Push once  dextrose 50% Injectable 12.5 Gram(s) IV Push once  dextrose 50% Injectable 25 Gram(s) IV Push once  glucagon  Injectable 1 milliGRAM(s) IntraMuscular once  hydrALAZINE 50 milliGRAM(s) Oral three times a day  insulin lispro (ADMELOG) corrective regimen sliding scale   SubCutaneous three times a day before meals  insulin lispro (ADMELOG) corrective regimen sliding scale   SubCutaneous at bedtime  isosorbide   dinitrate Tablet (ISORDIL) 5 milliGRAM(s) Oral three times a day  labetalol 200 milliGRAM(s) Oral every 8 hours  NIFEdipine XL 90 milliGRAM(s) Oral daily  senna 2 Tablet(s) Oral at bedtime  sevelamer carbonate 2400 milliGRAM(s) Oral three times a day    Vital Signs Last 24 Hrs  T(C): 36.4 (06-22-23 @ 13:45), Max: 36.6 (06-22-23 @ 09:00)  T(F): 97.6 (06-22-23 @ 13:45), Max: 97.9 (06-22-23 @ 09:00)  HR: 81 (06-22-23 @ 16:15) (76 - 92)  BP: 189/66 (06-22-23 @ 16:15) (155/54 - 208/71)  BP(mean): 96 (06-22-23 @ 16:15) (79 - 114)  RR: 18 (06-22-23 @ 16:15) (16 - 18)  SpO2: 99% (06-22-23 @ 16:15) (92% - 99%)    s1s2  b/l air entry  soft, ND  tr edema    A/P:    CAD, s/p cath yesterday, s/p PCI to mRCA today  HD today after PCI  TMP 2-3kg as tolerates  F/u CBC, BMP  Renal diet  [facility]    [phone]

## 2023-06-22 NOTE — H&P CARDIOLOGY - NSICDXPASTMEDICALHX_GEN_ALL_CORE_FT
PAST MEDICAL HISTORY:  CAD (coronary artery disease)     CHF (congestive heart failure)     ESRD on hemodialysis     GERD (gastroesophageal reflux disease)     HLD (hyperlipidemia)     HTN (hypertension)

## 2023-06-22 NOTE — H&P CARDIOLOGY - HISTORY OF PRESENT ILLNESS
72 y/o [place] speaking female with [place]  ID # [id number] Momo who is a poor historian confirmed with  received the medical history from her son Heriberto Arias ([phone]) who also is not a reliable source and [facility] chart.  Attempted to confirm medications with Pharmacy given by son [facility] in [place] will use historical medications and [facility]'s list.  Pt with pmh of HTN, HLD, DMT2 (AIc unknown), CAD s/p prior stents in [place] ~7 years ago ([facility] note 2019), ESRD on HD via Left AVF on T-TH-S at [facility] HD center is taken there by ambulette as per son with recent admission to [facility]/ for HFpEF LVEF 55-60% (2/2023) presented to [facility] on 6/20/23 with HTN urgency and SOB unable to lie flat.  She was last there 4/2023 with her LVEF 45-50%, Grade II DD, small PFO, moderate AVR, and moderate left pleural effusion.  She is s/p HD 6/21/23 and [facility] diagnostic vi RRA with Right dominant; LM Normal; pLAD 30%; pLCX, patent dLCX stent, 20-30% stenosis OM1; mRCA 80% in between patent stents.  She presents today for PCI of her mRCA.  She reports having SOB and unable to lie flat.  On exam she has wheezing b/l was able to lie her about 20 degrees with O2 NC in place.  Discussed with Dr. Walker. Will give her a  Nebulizer treatment to aid in her breathing comfort.

## 2023-06-22 NOTE — ED CLERICAL - NSCLERICAL TASK_GEN_ALL_ED
Orestes is a 68 yo male who is being evaluated via a billable telephone visit.       Please call patient on Home phone.      The patient has been notified of following:      This telephone visit will be conducted via a call between you and your physician/provider. We have found that certain health care needs can be provided without the need for a physical exam.  This service lets us provide the care you need with a short phone conversation.  If a prescription is necessary we can send it directly to your pharmacy.  If lab work is needed we can place an order for that and you can then stop by our lab to have the test done at a later time.     Telephone visits are billed at different rates depending on your insurance coverage. During this emergency period, for some insurers they may be billed the same as an in-person visit.  Please reach out to your insurance provider with any questions.     If during the course of the call the physician/provider feels a telephone visit is not appropriate, you will not be charged for this service.     Physician has received verbal consent for a Telephone Visit from the patient? Yes     How would you like to obtain your AVS?  My chart    Call started at  2:00 PM  Call ended at  2:15 PM  ___________________________________  Interventional Radiology Consult    First Name: Marquez  Age: 67 year old   Referring Physician: Dr. Medina   REASON FOR REFERRAL: Education and evaluation for tunneled catheter placement  Patient is undergoing w/u for autologous BMT, using his own stem cells as the donor     HPI:  he's had a long standing history of IgA MGUS which does not appear to have been monitored and did not become apparent again until he developed progressive cytopenias ultimately leading to his diagnosis of IgA K myeloma. He has two high risk features detected by FISH including t(4;14) and gain of 1q; however, he also has evidence of polysomy by chromosome analysis and FISH with the latter  Pre-Hospital Care Report (PCR) indicating hyperdiploidy of multiple odd numbered chromosomes.  He underwent a PET scan 2020 which revealed no FDG avid disease. He initiated RVD in mid to late 2020. After 4 cycles patient came for BMT work-up in April.  However his bone marrow bx shows about 8% plasma cells on the aspirate but 10 to 20% by immunochemistry stains and the core biopsy.  He was referred back for continuation of chemotherapy.  He received 2 more doses.  He is now undergoing work-up for autologous BMT.    LINE HX:  No previous cental lines    PAST MEDICAL HISTORY:   Past Medical History:   Diagnosis Date     Deviated nasal septum 2007    S/P SURGERY     Dupuytren's contracture of hand 2020    left 5th digit     Esophageal reflux 2007    S/P NISSEN FUNDOPLICATION     Hypercholesteremia      Major depressive disorder, recurrent, unspecified (H)      MEJIA on CPAP      Right knee DJD 2020    Meniscus tear. Will need arthroscopy.     Synovitis and tenosynovitis 2008    Both hands     Thyrotoxicosis 2020     PAST SURGICAL HISTORY:   Past Surgical History:   Procedure Laterality Date     ARTHROSCOPY KNEE Right 2010    meniscus tear     BONE MARROW BIOPSY       cataract extraction with intraocular lens implant Right 2017     COLONOSCOPY  ,2009     dupyton/arizmendi fascotomy  [id number]    left 5th digit     NISSEN FUNDOPLICATION  2007     SEPTOPLASTY       FAMILY HISTORY:   Family History   Problem Relation Age of Onset     Hypertension Mother      Hyperlipidemia Mother      Colon Cancer Father      Prostate Cancer Father      Diabetes Father      Heart Disease Father      Hyperlipidemia Father      SOCIAL HISTORY:   Social History     Tobacco Use     Smoking status: Former Smoker     Types: Cigars     Last attempt to quit: 1983     Years since quittin.5     Smokeless tobacco: Never Used     Tobacco comment: Occational cigar   Substance Use Topics     Alcohol use: Not Currently     PROBLEM LIST:    Patient Active Problem List    Diagnosis Date Noted     Edema of extremities 07/23/2020     Priority: Medium     Multiple myeloma in remission (H) 07/07/2020     Priority: Medium     Added automatically from request for surgery [id number]       Multiple myeloma not having achieved remission (H) 03/06/2020     Priority: Medium     Nonintractable migraine 12/09/2015     Priority: Medium     Episodic mood disorder (H) 05/12/2008     Priority: Medium     Degeneration of lumbar or lumbosacral intervertebral disc 04/13/2007     Priority: Medium     Headache 04/13/2007     Priority: Medium     Cluster headaches       Hypothyroidism 04/12/2007     Priority: Medium     MEDICATIONS:   Prescription Medications as of 7/23/2020       Rx Number Disp Refills Start End Last Dispensed Date Next Fill Date Owning Pharmacy    acyclovir (ZOVIRAX) 400 MG tablet    2/29/2020        Class: Historical    atorvastatin (LIPITOR) 10 MG tablet    8/23/2019        Sig: Take 10 mg by mouth    Class: Historical    Route: Oral    carBAMazepine (TEGRETOL XR) 200 MG 12 hr tablet    2/24/2020        Class: Historical    clonazePAM (KLONOPIN) 1 MG tablet    12/5/2019        Sig: TAKE 1/2 TABLET BY MOUTH IN THE MORNING AND TAKE 2 TABLETS EVERY NIGHT    Class: Historical    escitalopram (LEXAPRO) 20 MG tablet    12/5/2019        Sig: TAKE 1 TABLET EVERY MORNING AND 1 TABLET EVERY EVENING.    Class: Historical    levothyroxine (SYNTHROID/LEVOTHROID) 112 MCG tablet    2/6/2020        Class: Historical    oxyCODONE (OXY-IR) 5 MG capsule        [id number] [address]    Sig: Take 5 mg by mouth every 4 hours as needed for severe pain    Class: Historical    Route: Oral    PARoxetine (PAXIL) 20 MG tablet    6/25/2020    [facility] [address]    Sig: Take 20 mg by mouth every morning    Class: Historical    Route: Oral    PARoxetine (PAXIL) 40 MG tablet    3/1/2020        Class:  "Historical    prochlorperazine (COMPAZINE) 10 MG tablet    6/19/2020    [id number] - [address]    Sig: Take 10 mg by mouth every 8 hours as needed     Class: Historical    Route: Oral    SUMAtriptan (IMITREX) 50 MG tablet    10/14/2017        Sig: Take 50 mg by mouth    Class: Historical    Route: Oral    tacrolimus (PROTOPIC) 0.1 % external ointment    4/3/2019        Sig: APPLY 1 APPLICATION TWICE A DAY AS NEEDED TOPICALLY TO THE PSORIASIS IN GROIN AREA.    Class: Historical    topiramate (TOPAMAX) 100 MG tablet    2/24/2020        Class: Historical    triamcinolone (KENALOG) 0.1 % external ointment    10/14/2017        Class: Historical    Route: Topical      Hospital Medications as of 7/23/2020       Dose Frequency Start End    fentaNYL (PF) (SUBLIMAZE) injection 25-50 mcg 25-50 mcg EVERY 2 MIN PRN 7/23/2020     Admin Instructions: MAX cumulative dose = 100 mcg.  <BR>Use Fentanyl initially, as a short acting agent for acute pain control.  If insufficient, or a longer acting agent is needed, begin Morphine or Hydromorphone if ordered.<BR>For ordered IV doses 1-100 mcg give IV Push undiluted over a minimum of 3-5 minutes.    Route: Intravenous    lactated ringers infusion  CONTINUOUS 7/23/2020     Admin Instructions: Continue until IV catheter is weaned    Class: E-Prescribe    Route: Intravenous    lactated ringers infusion 500 mL CONTINUOUS 7/23/2020     Admin Instructions: IF patient NOT on dialysis.    Class: E-Prescribe    Route: Intravenous    lidocaine (LMX4) kit  EVERY 1 HOUR PRN 7/23/2020     Admin Instructions: Do NOT give if patient has a history of allergy to any local anesthetic or any \"grace\" product. <BR>Apply at least 30 minutes prior to VAD insertion or port access. In divided doses as needed for size of site for insertion with MAX Dose:  2.5 g (  of 5 g tube)    Class: E-Prescribe    Route: Topical    lidocaine (LMX4) kit  EVERY 1 HOUR PRN 7/23/2020     " "Admin Instructions: Do NOT give if patient has a history of allergy to any local anesthetic or any \"grace\" product. <BR>Apply at least 30 minutes prior to VAD insertion or port access. In divided doses as needed for size of site for insertion with MAX Dose:  2.5 g (  of 5 g tube)    Class: E-Prescribe    Route: Topical    lidocaine (LMX4) kit  EVERY 1 HOUR PRN 7/23/2020     Admin Instructions: Do NOT give if patient has a history of allergy to any local anesthetic or any \"grace\" product. <BR>Apply at least 30 minutes prior to VAD insertion or port access. In divided doses as needed for size of site for insertion with MAX Dose:  2.5 g (  of 5 g tube)    Class: E-Prescribe    Route: Topical    lidocaine (PF) (XYLOCAINE) 1 % injection 8-10 mL 8-10 mL EVERY 1 MIN PRN 7/23/2020     Admin Instructions: Provider administers the local anesthesia  May repeat process PRN until  bone marrow biopsy area is numb.    Class: E-Prescribe    Route: Intradermal    lidocaine 1 % 0.1-1 mL 0.1-1 mL EVERY 1 HOUR PRN 7/23/2020     Admin Instructions: Do NOT give if patient has a history of allergy to any local anesthetic or any \"grace\" product. MAX dose 1 mL subcutaneous OR intradermal in divided doses as needed for VAD insertion.    Class: E-Prescribe    Route: Other    lidocaine 1 % 0.1-1 mL 0.1-1 mL EVERY 1 HOUR PRN 7/23/2020     Admin Instructions: Do NOT give if patient has a history of allergy to any local anesthetic or any \"grace\" product. MAX dose 1 mL subcutaneous OR intradermal in divided doses as needed for VAD insertion.    Class: E-Prescribe    Route: Other    lidocaine 1 % 0.1-1 mL 0.1-1 mL EVERY 1 HOUR PRN 7/23/2020     Admin Instructions: Do NOT give if patient has a history of allergy to any local anesthetic or any \"grace\" product. MAX dose 1 mL subcutaneous OR intradermal in divided doses as needed for VAD insertion.    Class: E-Prescribe    Route: Other    meperidine (DEMEROL) injection 12.5 mg 12.5 mg EVERY 15 MIN PRN " "7/23/2020     Admin Instructions: Give IV Push undiluted. 10-40 mg over 2-3 minutes, up to 125 mg over 3-15 minutes    Route: Intravenous    naloxone (NARCAN) injection 0.1-0.4 mg 0.1-0.4 mg EVERY 2 MIN PRN 7/23/2020 7/24/2020    Admin Instructions: For apnea or imminent respiratory arrest: give 0.4 mg IV undiluted Q 2 minutes PRN until desired degree of reversal is obtained, stop opioid and notify provider. Continue monitoring until discharge are criteria met for a minimum of 2 hours.<BR>For severe sedation, decrease in respiratory depth, quality or Respiratory Rate greater than 8: give 0.1 mg IV Q 2 minutes x 3 doses, stop opioid and notify provider.  Try to minimize reversal of analgesia especially in end-of-life patients.  Continue monitoring until discharge criteria are met for a minimum of 2 hours.<BR>For ordered IV doses 0.1-2mg give IVP. Give each 0.4mg over 15 seconds in emergency situations. For non-emergent situations further dilute in 9mL of NS to facilitate titration of response.    Class: E-Prescribe    Route: Intravenous    ondansetron (ZOFRAN) injection 4 mg 4 mg EVERY 30 MIN PRN 7/23/2020     Admin Instructions: MAX total dose = 8 mg, including OR dosing. This is step 1 of nausea and vomiting management.  If not resolved in 15 minutes, then go to step 2 [prochlorperazine (COMPAZINE) if ordered].<BR>Irritant. For ordered IV doses 0.1-4 mg, give IV Push undiluted over 2-5 minutes.    Class: E-Prescribe    Route: Intravenous    Linked Group 1:  \"Or\" Linked Group Details        ondansetron (ZOFRAN-ODT) ODT tab 4 mg 4 mg EVERY 30 MIN PRN 7/23/2020     Admin Instructions: MAX total dose = 8 mg, including OR dosing. This is step 1 of nausea and vomiting management.  If not resolved in 15 minutes, then go to step 2 [prochlorperazine (COMPAZINE) if ordered].<BR>With dry hands, peel back foil backing and gently remove tablet. Do not push oral disintegrating tablet through foil backing. Administer immediately " "on tongue and oral disintegrating tablet dissolves in seconds, then swallow with saliva. Liquid not required.    Class: E-Prescribe    Route: Oral    Linked Group 1:  \"Or\" Linked Group Details        oxyCODONE (ROXICODONE) tablet 5 mg 5 mg EVERY 4 HOURS PRN 7/23/2020     Admin Instructions: Max: 5 mg for opioid-naïve patient.    Route: Oral    prochlorperazine (COMPAZINE) injection 5 mg 5 mg EVERY 6 HOURS PRN 7/23/2020     Admin Instructions: This is Step 2 of nausea and vomiting management. <BR>If nausea not resolved in 15 minutes, give metoclopramide (REGLAN) if ordered [step 3 of nausea and vomiting management].<BR>For ordered IV doses 0.1-10 mg, give IV Push undiluted. Each 5mg over 1 minute.    Class: E-Prescribe    Route: Intravenous    sodium chloride (PF) 0.9% PF flush 3 mL 3 mL EVERY 1 MIN PRN 7/23/2020     Class: E-Prescribe    Route: Intracatheter    sodium chloride (PF) 0.9% PF flush 3 mL 3 mL EVERY 8 HOURS 7/23/2020     Admin Instructions: And Q1H PRN, to lock peripheral IV dormant line.    Class: E-Prescribe    Route: Intracatheter    sodium chloride (PF) 0.9% PF flush 3 mL 3 mL EVERY 1 MIN PRN 7/23/2020     Class: E-Prescribe    Route: Intracatheter    sodium chloride (PF) 0.9% PF flush 3 mL 3 mL EVERY 8 HOURS 7/23/2020     Admin Instructions: And Q1H PRN, to lock peripheral IV dormant line.    Class: E-Prescribe    Route: Intracatheter    sodium chloride (PF) 0.9% PF flush 3 mL 3 mL EVERY 1 MIN PRN 7/23/2020     Class: E-Prescribe    Route: Intracatheter    sodium chloride (PF) 0.9% PF flush 3 mL 3 mL EVERY 8 HOURS 7/23/2020     Admin Instructions: And Q1H PRN, to lock peripheral IV dormant line.    Class: E-Prescribe    Route: Intracatheter        ALLERGIES:    Allergies   Allergen Reactions     Penicillins Hives     Amoxicillin Rash     VITALS: There were no vitals taken for this visit.    ROS:  Skin: negative  Musculoskeletal: negative  Neurologic: negative  Psychiatric: negative    Physical " Examination: virtual visit  Pleasant, oriented    RESULTS:  BMP RESULTS:  Lab Results   Component Value Date     07/20/2020    POTASSIUM 3.3 (L) 07/20/2020    CHLORIDE 105 07/20/2020    CO2 22 07/20/2020    ANIONGAP 6 07/20/2020    GLC 83 07/20/2020    BUN 18 07/20/2020    CR 0.72 07/23/2020    CR 0.72 07/20/2020    GFRESTIMATED >90 07/20/2020    GFRESTBLACK >90 07/20/2020    RANDA 8.2 (L) 07/20/2020        CBC RESULTS:  Lab Results   Component Value Date    WBC 4.2 07/23/2020    RBC 3.58 (L) 07/23/2020    HGB 12.7 (L) 07/23/2020    HCT 36.6 (L) 07/23/2020     (H) 07/23/2020    MCH 35.5 (H) 07/23/2020    MCHC 34.7 07/23/2020    RDW 13.8 07/23/2020     07/23/2020       INR/PTT:  Lab Results   Component Value Date    INR 1.15 (H) 07/20/2020    PTT 25 07/20/2020       ASSESSMENT/PLAN:  Type of catheter: Large bore double lumen tunneled apheresis capable catheter     Preferred Location: Right  Internal Jugular Vein     Platelet count is   Lab Results   Component Value Date     07/23/2020    , and coagulation factors are   Lab Results   Component Value Date    INR 1.15 07/20/2020    ,  Lab Results   Component Value Date    PTT 25 07/20/2020   . The patient is at low risk for bleeding during the procedure.    PROVIDER NOTE:  The tunneled catheter placement procedure and its risks including but not limited to bleeding, infection, fibrin sheath formation and blood clots was explained to Orestes.    CONSENT: Affirmation of informed written consent was not obtained.     INSTRUCTIONS/GUIDELINES:   Eating and drinking restriction guidelines were reviewed., Anti-bacterial scrub was given and instructions for its use were reviewed to decrease the risk of infection on the day of the procedure., I explained the expected length of time the tunneled catheter could remain in place., I explained that when they went to the [facility] they would be taught about exit site dressing care, flushing of the  lumens and how to care for the catheter when bathing., The role of Interventional Radiology was reviewed. and The principles of infection control were reviewed.     Gayla Le MS, APRN, CNS, CRN  Clinical Nurse Specialist  Interventional Radiology  [phone] (voice mail)  [phone] (pager)    CC  Patient Care Team:  Rachel Davis MD as PCP - General (Internal Medicine)  Veronica Queen LICSW as  ( - Clinical)  Wendy Foreman MSW as  (BMT - Adult)  Paddy Medina MD as PADDY PATTERSON

## 2023-06-22 NOTE — PATIENT PROFILE ADULT - FUNCTIONAL ASSESSMENT - BASIC MOBILITY 6.
3-calculated by average/Not able to assess (calculate score using [facility] averaging method)

## 2023-06-22 NOTE — PATIENT PROFILE ADULT - FALL HARM RISK - RISK INTERVENTIONS

## 2023-06-23 ENCOUNTER — TRANSCRIPTION ENCOUNTER (OUTPATIENT)
Age: 73
End: 2023-06-23

## 2023-06-23 VITALS
HEART RATE: 82 BPM | OXYGEN SATURATION: 96 % | DIASTOLIC BLOOD PRESSURE: 60 MMHG | SYSTOLIC BLOOD PRESSURE: 166 MMHG | TEMPERATURE: 98 F | RESPIRATION RATE: 17 BRPM

## 2023-06-23 DIAGNOSIS — I10 ESSENTIAL (PRIMARY) HYPERTENSION: ICD-10-CM

## 2023-06-23 DIAGNOSIS — I25.10 ATHEROSCLEROTIC HEART DISEASE OF NATIVE CORONARY ARTERY WITHOUT ANGINA PECTORIS: ICD-10-CM

## 2023-06-23 DIAGNOSIS — E78.5 HYPERLIPIDEMIA, UNSPECIFIED: ICD-10-CM

## 2023-06-23 LAB
ALBUMIN SERPL ELPH-MCNC: 3.7 G/DL — SIGNIFICANT CHANGE UP (ref 3.3–5)
ALP SERPL-CCNC: 123 U/L — HIGH (ref 40–120)
ALT FLD-CCNC: 8 U/L — LOW (ref 10–45)
ANION GAP SERPL CALC-SCNC: 11 MMOL/L — SIGNIFICANT CHANGE UP (ref 5–17)
AST SERPL-CCNC: 12 U/L — SIGNIFICANT CHANGE UP (ref 10–40)
BASOPHILS # BLD AUTO: 0.04 K/UL — SIGNIFICANT CHANGE UP (ref 0–0.2)
BASOPHILS NFR BLD AUTO: 0.6 % — SIGNIFICANT CHANGE UP (ref 0–2)
BILIRUB SERPL-MCNC: 0.3 MG/DL — SIGNIFICANT CHANGE UP (ref 0.2–1.2)
BUN SERPL-MCNC: 9 MG/DL — SIGNIFICANT CHANGE UP (ref 7–23)
CALCIUM SERPL-MCNC: 8.4 MG/DL — SIGNIFICANT CHANGE UP (ref 8.4–10.5)
CHLORIDE SERPL-SCNC: 97 MMOL/L — SIGNIFICANT CHANGE UP (ref 96–108)
CO2 SERPL-SCNC: 29 MMOL/L — SIGNIFICANT CHANGE UP (ref 22–31)
CREAT SERPL-MCNC: 2.23 MG/DL — HIGH (ref 0.5–1.3)
EGFR: 23 ML/MIN/1.73M2 — LOW
EOSINOPHIL # BLD AUTO: 0.54 K/UL — HIGH (ref 0–0.5)
EOSINOPHIL NFR BLD AUTO: 7.5 % — HIGH (ref 0–6)
GLUCOSE SERPL-MCNC: 177 MG/DL — HIGH (ref 70–99)
HCT VFR BLD CALC: 31.2 % — LOW (ref 34.5–45)
HGB BLD-MCNC: 9.9 G/DL — LOW (ref 11.5–15.5)
IMM GRANULOCYTES NFR BLD AUTO: 0.4 % — SIGNIFICANT CHANGE UP (ref 0–0.9)
LYMPHOCYTES # BLD AUTO: 0.86 K/UL — LOW (ref 1–3.3)
LYMPHOCYTES # BLD AUTO: 11.9 % — LOW (ref 13–44)
MAGNESIUM SERPL-MCNC: 1.9 MG/DL — SIGNIFICANT CHANGE UP (ref 1.6–2.6)
MCHC RBC-ENTMCNC: 26.5 PG — LOW (ref 27–34)
MCHC RBC-ENTMCNC: 31.7 GM/DL — LOW (ref 32–36)
MCV RBC AUTO: 83.6 FL — SIGNIFICANT CHANGE UP (ref 80–100)
MONOCYTES # BLD AUTO: 0.71 K/UL — SIGNIFICANT CHANGE UP (ref 0–0.9)
MONOCYTES NFR BLD AUTO: 9.8 % — SIGNIFICANT CHANGE UP (ref 2–14)
NEUTROPHILS # BLD AUTO: 5.04 K/UL — SIGNIFICANT CHANGE UP (ref 1.8–7.4)
NEUTROPHILS NFR BLD AUTO: 69.8 % — SIGNIFICANT CHANGE UP (ref 43–77)
NRBC # BLD: 0 /100 WBCS — SIGNIFICANT CHANGE UP (ref 0–0)
PLATELET # BLD AUTO: 114 K/UL — LOW (ref 150–400)
POTASSIUM SERPL-MCNC: 3.3 MMOL/L — LOW (ref 3.5–5.3)
POTASSIUM SERPL-SCNC: 3.3 MMOL/L — LOW (ref 3.5–5.3)
PROT SERPL-MCNC: 5.9 G/DL — LOW (ref 6–8.3)
RBC # BLD: 3.73 M/UL — LOW (ref 3.8–5.2)
RBC # FLD: 15.6 % — HIGH (ref 10.3–14.5)
SODIUM SERPL-SCNC: 137 MMOL/L — SIGNIFICANT CHANGE UP (ref 135–145)
WBC # BLD: 7.22 K/UL — SIGNIFICANT CHANGE UP (ref 3.8–10.5)
WBC # FLD AUTO: 7.22 K/UL — SIGNIFICANT CHANGE UP (ref 3.8–10.5)

## 2023-06-23 PROCEDURE — 99261: CPT

## 2023-06-23 PROCEDURE — C9600: CPT | Mod: RC

## 2023-06-23 PROCEDURE — C1769: CPT

## 2023-06-23 PROCEDURE — 80053 COMPREHEN METABOLIC PANEL: CPT

## 2023-06-23 PROCEDURE — C1887: CPT

## 2023-06-23 PROCEDURE — C1874: CPT

## 2023-06-23 PROCEDURE — 85025 COMPLETE CBC W/AUTO DIFF WBC: CPT

## 2023-06-23 PROCEDURE — 83735 ASSAY OF MAGNESIUM: CPT

## 2023-06-23 PROCEDURE — 94640 AIRWAY INHALATION TREATMENT: CPT

## 2023-06-23 PROCEDURE — 93005 ELECTROCARDIOGRAM TRACING: CPT

## 2023-06-23 PROCEDURE — 82962 GLUCOSE BLOOD TEST: CPT

## 2023-06-23 PROCEDURE — C1894: CPT

## 2023-06-23 RX ORDER — SIMETHICONE 80 MG/1
1 TABLET, CHEWABLE ORAL
Qty: 0 | Refills: 0 | DISCHARGE
Start: 2023-06-23

## 2023-06-23 RX ORDER — SIMETHICONE 80 MG/1
1 TABLET, CHEWABLE ORAL
Refills: 0
Start: 2023-06-23

## 2023-06-23 RX ORDER — ASPIRIN/CALCIUM CARB/MAGNESIUM 324 MG
1 TABLET ORAL
Refills: 0 | DISCHARGE

## 2023-06-23 RX ORDER — NIFEDIPINE 30 MG
1 TABLET, EXTENDED RELEASE 24 HR ORAL
Qty: 30 | Refills: 0
Start: 2023-06-23 | End: 2023-07-22

## 2023-06-23 RX ORDER — ASPIRIN/CALCIUM CARB/MAGNESIUM 324 MG
1 TABLET ORAL
Qty: 90 | Refills: 3
Start: 2023-06-23 | End: 2024-06-16

## 2023-06-23 RX ORDER — SIMETHICONE 80 MG/1
1 TABLET, CHEWABLE ORAL
Qty: 180 | Refills: 0
Start: 2023-06-23 | End: 2023-07-22

## 2023-06-23 RX ORDER — NIFEDIPINE 30 MG
1 TABLET, EXTENDED RELEASE 24 HR ORAL
Refills: 0 | DISCHARGE

## 2023-06-23 RX ORDER — POTASSIUM CHLORIDE 20 MEQ
40 PACKET (EA) ORAL ONCE
Refills: 0 | Status: COMPLETED | OUTPATIENT
Start: 2023-06-23 | End: 2023-06-23

## 2023-06-23 RX ADMIN — Medication 50 MILLIGRAM(S): at 06:07

## 2023-06-23 RX ADMIN — ISOSORBIDE DINITRATE 5 MILLIGRAM(S): 5 TABLET ORAL at 06:07

## 2023-06-23 RX ADMIN — Medication 200 MILLIGRAM(S): at 13:36

## 2023-06-23 RX ADMIN — SIMETHICONE 80 MILLIGRAM(S): 80 TABLET, CHEWABLE ORAL at 08:18

## 2023-06-23 RX ADMIN — Medication 30 MILLILITER(S): at 08:18

## 2023-06-23 RX ADMIN — ISOSORBIDE DINITRATE 5 MILLIGRAM(S): 5 TABLET ORAL at 13:30

## 2023-06-23 RX ADMIN — Medication 40 MILLIEQUIVALENT(S): at 06:07

## 2023-06-23 RX ADMIN — SEVELAMER CARBONATE 2400 MILLIGRAM(S): 2400 POWDER, FOR SUSPENSION ORAL at 07:39

## 2023-06-23 RX ADMIN — ALBUTEROL 2 PUFF(S): 90 AEROSOL, METERED ORAL at 08:17

## 2023-06-23 RX ADMIN — Medication 50 MILLIGRAM(S): at 13:35

## 2023-06-23 RX ADMIN — Medication 200 MILLIGRAM(S): at 06:07

## 2023-06-23 RX ADMIN — CLOPIDOGREL BISULFATE 75 MILLIGRAM(S): 75 TABLET, FILM COATED ORAL at 06:08

## 2023-06-23 RX ADMIN — ALBUTEROL 2 PUFF(S): 90 AEROSOL, METERED ORAL at 13:30

## 2023-06-23 RX ADMIN — Medication 81 MILLIGRAM(S): at 06:08

## 2023-06-23 RX ADMIN — Medication 30 MILLILITER(S): at 13:30

## 2023-06-23 NOTE — DISCHARGE NOTE PROVIDER - NSDCMRMEDTOKEN_GEN_ALL_CORE_FT
Albuterol (Eqv-ProAir HFA) 90 mcg/inh inhalation aerosol: 2 puff(s) inhaled every 6 hours as needed for  shortness of breath and/or wheezing  aluminum hydroxide-magnesium hydroxide 200 mg-200 mg/5 mL oral suspension: 30 milliliter(s) orally every 4 hours As needed Dyspepsia  aspirin 81 mg oral delayed release tablet: 1 tab(s) orally once a day  atorvastatin 20 mg oral tablet: 1 tab(s) orally once a day (at bedtime)  clopidogrel 75 mg oral tablet: 1 tab(s) orally once a day  hydrALAZINE 50 mg oral tablet: 1 tab(s) orally 3 times a day  isosorbide dinitrate 5 mg oral tablet: 1 tab(s) orally every 8 hours  labetalol 200 mg oral tablet: 1 tab(s) orally every 8 hours  NIFEdipine (Eqv-Adalat CC) 90 mg oral tablet, extended release: 1 tab(s) orally once a day  sevelamer carbonate 800 mg oral tablet: 3 tab(s) orally 3 times a day  simethicone 80 mg oral tablet, chewable: 1 tab(s) orally every 4 hours As needed Gas

## 2023-06-23 NOTE — DISCHARGE NOTE PROVIDER - HOSPITAL COURSE
HPI:  74 y/o Ryan speaking female with [place]  ID # [id number] Momo who is a poor historian confirmed with  received the medical history from her son Heriberto Arias ([phone]) who also is not a reliable source and [facility] chart.  Attempted to confirm medications with Pharmacy [facility] [place] will use historical medications and [facility]'s list.  Pt with pmh of HTN, HLD, DMT2 (AIc unknown), CAD s/p prior stents in [place] ~7 years ago ([facility] note 2019), ESRD on HD via Left AVF on T-TH-S at [facility] is taken there by ambulette as per son with recent admission to [facility]/ for HFpEF LVEF 55-60% (2/2023) presented to [facility] on 6/20/23 with HTN urgency and SOB unable to lie flat.  She was last there 4/2023 with her LVEF 45-50%, Grade II DD, small PFO, moderate AVR, and moderate left pleural effusion.  She is s/p HD 6/21/23 and [facility] diagnostic vi RRA with Right dominant; LM Normal; pLAD 30%; pLCX, patent dLCX stent, 20-30% stenosis OM1; mRCA 80% in between patent stents.  She presents today for PCI of her mRCA.  She reports having SOB and unable to lie flat.  On exam she has wheezing b/l was able to lie her about 20 degrees with O2 NC in place.  Discussed with Dr. Walker. Will give her a  Nebulizer treatment to aid in her breathing comfort. (22 Jun 2023 09:54).      6/22 cardiac cath with one stent to the mid RCA. Right radial artery access site without swelling, bleeding.   HPI:  72 y/o Ryan speaking female with [place]  ID # [id number] Momo who is a poor historian confirmed with  received the medical history from her son Heriberto Arias ([phone]) who also is not a reliable source and [facility] chart.  Attempted to confirm medications with Pharmacy [facility] [place] will use historical medications and [facility]'s list.  Pt with pmh of HTN, HLD, DMT2 (AIc unknown), CAD s/p prior stents in [place] ~7 years ago ([facility] note 2019), ESRD on HD via Left AVF on T-TH-S at [facility] center is taken there by ambulette as per son with recent admission to [facility] for HFpEF LVEF 55-60% (2/2023) presented to [facility] on 6/20/23 with HTN urgency and SOB unable to lie flat.  She was last there 4/2023 with her LVEF 45-50%, Grade II DD, small PFO, moderate AVR, and moderate left pleural effusion.  She is s/p HD 6/21/23 and [facility] diagnostic vi RRA with Right dominant; LM Normal; pLAD 30%; pLCX, patent dLCX stent, 20-30% stenosis OM1; mRCA 80% in between patent stents.  She presents today for PCI of her mRCA.  She reports having SOB and unable to lie flat.  On exam she has wheezing b/l was able to lie her about 20 degrees with O2 NC in place.  Discussed with Dr. Walker. Will give her a  Nebulizer treatment to aid in her breathing comfort. (22 Jun 2023 09:54).      6/22 cardiac cath with one stent to the mid RCA. Right radial artery access site without swelling, bleeding.  6/23 Pt stable with no tele or site events.  She had HD last night with 1100cc of fluid removal.  Tolerated well with BP stable.  She reported abdominal discomfort during the night with history of chronic constipation given bowel regimen and had successful BM this am.  Pt feeling well and is stable for d/c home. She will f/u with Dr. Brown Trujillo at [facility] in 2 weeks post discharge.  He HD was reinstated by CM for tomorrow as outpatient.

## 2023-06-23 NOTE — DISCHARGE NOTE NURSING/CASE MANAGEMENT/SOCIAL WORK - NSDCPEFALRISK_GEN_ALL_CORE
For information on Fall & Injury Prevention, visit: [email or web address] OR  [email or web address] OR  https://www.cdc.gov/steadi/patient.html

## 2023-06-23 NOTE — DISCHARGE NOTE PROVIDER - NSDCCPCAREPLAN_GEN_ALL_CORE_FT
PRINCIPAL DISCHARGE DIAGNOSIS  Diagnosis: CAD (coronary artery disease)  Assessment and Plan of Treatment: Do not stop your aspirin or Plavix unless instructed to do so by your cardiologist, they help keep your stented arteries open.   No heavy lifting or pushing/pulling with procedure arm for 2 weeks. No driving for 2 days. You may shower 24 hours following the procedure but avoid baths/swimming for 1 week. Check your wrist site for bleeding and/or swelling daily following procedure and call your doctor immediately if it occurs or if you experience increased pain at the site. Follow up with your cardiologist in 1-2 weeks. You may call [facility] if you have any questions/concerns regarding your procedure [phone].      SECONDARY DISCHARGE DIAGNOSES  Diagnosis: HTN (hypertension)  Assessment and Plan of Treatment: Continue with your blood pressure medications; eat a heart healthy diet with low salt diet; exercise regularly (consult with your physician or cardiologist first); maintain a heart healthy weight; if you smoke - quit (A resource to help you stop smoking is the [facility] [employer] [facility] – phone number [phone].); include healthy ways to manage stress. Continue to follow with your primary care physician or cardiologist.    Diagnosis: HLD (hyperlipidemia)  Assessment and Plan of Treatment: Continue with your cholesterol medications. Eat a heart healthy diet that is low in saturated fats and salt, and includes whole grains, fruits, vegetables and lean protein; exercise regularly (consult with your physician or cardiologist first); maintain a heart healthy weight; if you smoke - quit (A resource to help you stop smoking is the [facility] [employer] – phone number [phone].). Continue to follow with your primary physician or cardiologist.    Diagnosis: DM (diabetes mellitus)  Assessment and Plan of Treatment: Your Hemoglobin A1c level is   Continue to follow with your primary care MD or your endocrinologist.  Follow a heart healthy diabetic diet. If you check your fingerstick glucose at home, call your MD if it is greater than 250mg/dL on 2 occasions or less than 100mg/dL on 2 occasions. Know signs of low blood sugar, such as: dizziness, shakiness, sweating, confusion, hunger, nervousness-drink 4 ounces apple juice if occurs and call your doctor. Know early signs of high blood sugar, such as: frequent urination, increased thirst, blurry vision, fatigue, headache - call your doctor if this occurs. Follow with other practitioners to care for your diabetes, such as ophthalmologist and podiatrist.     PRINCIPAL DISCHARGE DIAGNOSIS  Diagnosis: CAD (coronary artery disease)  Assessment and Plan of Treatment: Do not stop your aspirin or Plavix unless instructed to do so by your cardiologist, they help keep your stented arteries open.   Low salt, low fat diet.   Weight management.   Take medications as prescribed.    No smoking.  Follow up appointments with your doctor(s)  as instructed.      SECONDARY DISCHARGE DIAGNOSES  Diagnosis: HTN (hypertension)  Assessment and Plan of Treatment: Continue with your blood pressure medications; eat a heart healthy diet with low salt diet; exercise regularly (consult with your physician or cardiologist first); maintain a heart healthy weight; if you smoke - quit (A resource to help you stop smoking is the [facility] for [facility] Control – phone number [phone].); include healthy ways to manage stress. Continue to follow with your primary care physician or cardiologist.    Diagnosis: HLD (hyperlipidemia)  Assessment and Plan of Treatment: Continue with your cholesterol medications. Eat a heart healthy diet that is low in saturated fats and salt, and includes whole grains, fruits, vegetables and lean protein; exercise regularly (consult with your physician or cardiologist first); maintain a heart healthy weight; if you smoke - quit (A resource to help you stop smoking is the [facility] for Tobacco Control – phone number [phone].). Continue to follow with your primary physician or cardiologist.    Diagnosis: DM (diabetes mellitus)  Assessment and Plan of Treatment: Your Hemoglobin A1c level is   Continue to follow with your primary care MD or your endocrinologist.  Follow a heart healthy diabetic diet. If you check your fingerstick glucose at home, call your MD if it is greater than 250mg/dL on 2 occasions or less than 100mg/dL on 2 occasions. Know signs of low blood sugar, such as: dizziness, shakiness, sweating, confusion, hunger, nervousness-drink 4 ounces apple juice if occurs and call your doctor. Know early signs of high blood sugar, such as: frequent urination, increased thirst, blurry vision, fatigue, headache - call your doctor if this occurs. Follow with other practitioners to care for your diabetes, such as ophthalmologist and podiatrist.    Diagnosis: ESRD on hemodialysis  Assessment and Plan of Treatment: Continue with your regular dialysis schedule outpatient at [facility]

## 2023-06-23 NOTE — DISCHARGE NOTE PROVIDER - DISCHARGE DIET
DASH Diet/Consistent Carbohydrate Diabetic Diets DASH Diet/Consistent Carbohydrate Diabetic Diets/Renal Diets (for dialysis)

## 2023-06-23 NOTE — PROGRESS NOTE ADULT - SUBJECTIVE AND OBJECTIVE BOX
HPI:  72 y/o Ryan speaking female with [place]  ID # [id number] Momo who is a poor historian confirmed with  received the medical history from her son Heriberto Arias ([phone]) who also is not a reliable source and [facility] chart.  Attempted to confirm medications with Pharmacy given by son [facility] in [place] will use historical medications and [facility]'s list.  Pt with pmh of HTN, HLD, DMT2 (AIc unknown), CAD s/p prior stents in [place] ~7 years ago ([facility] note 2019), ESRD on HD via Left AVF on  at [facility] HD center is taken there by ambulette as per son with recent admission to [facility]/ for HFpEF LVEF 55-60% (2023) presented to [facility] on 23 with HTN urgency and SOB unable to lie flat.  She was last there 2023 with her LVEF 45-50%, Grade II DD, small PFO, moderate AVR, and moderate left pleural effusion.  She is s/p HD 23 and [facility] diagnostic vi RRA with Right dominant; LM Normal; pLAD 30%; pLCX, patent dLCX stent, 20-30% stenosis OM1; mRCA 80% in between patent stents.  She presents today for PCI of her mRCA.  She reports having SOB and unable to lie flat.  On exam she has wheezing b/l was able to lie her about 20 degrees with O2 NC in place.  Discussed with Dr. Walker. Will give her a  Nebulizer treatment to aid in her breathing comfort. (2023 09:54)    Patient is a 73y old  Female who presents with a chief complaint of         Allergies    No Known Allergies    Intolerances        Medications:  albuterol    90 MICROgram(s) HFA Inhaler 2 Puff(s) Inhalation every 6 hours PRN  aluminum hydroxide/magnesium hydroxide/simethicone Suspension 30 milliLiter(s) Oral every 4 hours PRN  aspirin enteric coated 81 milliGRAM(s) Oral daily  atorvastatin 20 milliGRAM(s) Oral at bedtime  bisacodyl 5 milliGRAM(s) Oral at bedtime PRN  clopidogrel Tablet 75 milliGRAM(s) Oral daily  dextrose 5%. 1000 milliLiter(s) IV Continuous <Continuous>  dextrose 5%. 1000 milliLiter(s) IV Continuous <Continuous>  dextrose 50% Injectable 12.5 Gram(s) IV Push once  dextrose 50% Injectable 25 Gram(s) IV Push once  dextrose 50% Injectable 25 Gram(s) IV Push once  dextrose Oral Gel 15 Gram(s) Oral once PRN  glucagon  Injectable 1 milliGRAM(s) IntraMuscular once  hydrALAZINE 50 milliGRAM(s) Oral three times a day  insulin lispro (ADMELOG) corrective regimen sliding scale   SubCutaneous three times a day before meals  insulin lispro (ADMELOG) corrective regimen sliding scale   SubCutaneous at bedtime  isosorbide   dinitrate Tablet (ISORDIL) 5 milliGRAM(s) Oral three times a day  labetalol 200 milliGRAM(s) Oral every 8 hours  NIFEdipine XL 90 milliGRAM(s) Oral daily  potassium chloride    Tablet ER 40 milliEquivalent(s) Oral once  senna 2 Tablet(s) Oral at bedtime  sevelamer carbonate 2400 milliGRAM(s) Oral three times a day  simethicone 80 milliGRAM(s) Chew every 4 hours PRN      Vitals:  T(C): 36.2 (23 @ 21:20), Max: 36.6 (23 @ 09:00)  HR: 67 (23 @ 00:30) (67 - 92)  BP: 126/52 (23 @ 00:30) (126/52 - 208/71)  BP(mean): 67 (23 @ 00:30) (67 - 114)  RR: 17 (23 @ 00:30) (16 - 19)  SpO2: 98% (23 @ 00:30) (92% - 100%)  Wt(kg): --  Daily     Daily Weight in k.9 (2023 21:20)  I&O's Summary    2023 07:01  -  2023 03:00  --------------------------------------------------------  IN: 0 mL / OUT: 1100 mL / NET: -1100 mL          Physical Exam:  Appearance: Normal  Eyes: PERRL, EOMI  HENT: Normal oral muscosa, NC/AT  Cardiovascular: S1S2, RRR, No M/R/G, no JVD, No Lower extremity edema  Procedural Access Site: No hematoma, Non-tender to palpation, 2+ pulse, No bruit, No Ecchymosis  Respiratory: Clear to auscultation bilaterally  Gastrointestinal: Soft, Non tender, Normal Bowel Sounds  Musculoskeletal: No clubbing, No joint deformity   Neurologic: Non-focal  Lymphatic: No lymphadenopathy  Psychiatry: AAOx3, Mood & affect appropriate  Skin: No rashes, No ecchymoses, No cyanosis        137  |  97  |  9   ----------------------------<  177<H>  3.3<L>   |  29  |  2.23<H>    Ca    8.4      2023 01:38  Mg     1.9         TPro  5.9<L>  /  Alb  3.7  /  TBili  0.3  /  DBili  x   /  AST  12  /  ALT  8<L>  /  AlkPhos  123<H>              Lipid panel   Hgb A1c                         9.9    7.22  )-----------( 114      ( 2023 01:38 )             31.2         ECG: SR 90 bpm

## 2023-06-23 NOTE — DISCHARGE NOTE PROVIDER - NSDCCPTREATMENT_GEN_ALL_CORE_FT
PRINCIPAL PROCEDURE  Procedure: Placement of coronary artery stent  Findings and Treatment: One stent to the mid RCA. Continue DAPT with aspirin, Plavix.

## 2023-06-23 NOTE — DISCHARGE NOTE NURSING/CASE MANAGEMENT/SOCIAL WORK - PATIENT PORTAL LINK FT
You can access the FollowMyHealth Patient Portal offered by [facility] by registering at the following website: [email or web address]. By joining [facility]’s FollowMyHealth portal, you will also be able to view your health information using other applications (apps) compatible with our system.

## 2023-06-23 NOTE — DISCHARGE NOTE PROVIDER - CARE PROVIDER_API CALL
Brown Trujillo  Cardiology  [address] [id number]  Phone: [phone]  Fax: [phone]  Follow Up Time: 2 weeks

## 2023-06-23 NOTE — PROGRESS NOTE ADULT - SUBJECTIVE AND OBJECTIVE BOX
No distress, comfortable on RA    Vital Signs Last 24 Hrs  T(C): 36.6 (06-23-23 @ 12:03), Max: 36.6 (06-23-23 @ 08:25)  T(F): 97.9 (06-23-23 @ 12:03), Max: 97.9 (06-23-23 @ 12:03)  HR: 80 (06-23-23 @ 12:03) (67 - 87)  BP: 150/80 (06-23-23 @ 12:03) (126/52 - 193/65)  BP(mean): 96 (06-23-23 @ 12:03) (67 - 102)  RR: 17 (06-23-23 @ 12:03) (17 - 19)  SpO2: 92% (06-23-23 @ 12:03) (92% - 100%)    I&O's Detail    22 Jun 2023 07:01  -  23 Jun 2023 07:00  --------------------------------------------------------  OUT:    Other (mL): 1100 mL  Total OUT: 1100 mL    23 Jun 2023 07:01  -  23 Jun 2023 15:08  --------------------------------------------------------  IN:    Oral Fluid: 240 mL  Total IN: 240 mL    OUT:  Total OUT: 0 mL    Total NET: 240 mL    s1s2  b/l air entry  soft, ND  no edema                      9.9    7.22  )-----------( 114      ( 23 Jun 2023 01:38 )             31.2     23 Jun 2023 01:38    137    |  97     |  9      ----------------------------<  177    3.3     |  29     |  2.23     Ca    8.4        23 Jun 2023 01:38  Mg     1.9       23 Jun 2023 01:38    TPro  5.9    /  Alb  3.7    /  TBili  0.3    /  DBili  x      /  AST  12     /  ALT  8      /  AlkPhos  123    23 Jun 2023 01:38    LIVER FUNCTIONS - ( 23 Jun 2023 01:38 )  Alb: 3.7 g/dL / Pro: 5.9 g/dL / ALK PHOS: 123 U/L / ALT: 8 U/L / AST: 12 U/L / GGT: x           albuterol    90 MICROgram(s) HFA Inhaler 2 Puff(s) Inhalation every 6 hours PRN  aluminum hydroxide/magnesium hydroxide/simethicone Suspension 30 milliLiter(s) Oral every 4 hours PRN  aspirin enteric coated 81 milliGRAM(s) Oral daily  atorvastatin 20 milliGRAM(s) Oral at bedtime  bisacodyl 5 milliGRAM(s) Oral at bedtime PRN  clopidogrel Tablet 75 milliGRAM(s) Oral daily  dextrose 5%. 1000 milliLiter(s) IV Continuous <Continuous>  dextrose 5%. 1000 milliLiter(s) IV Continuous <Continuous>  dextrose 50% Injectable 25 Gram(s) IV Push once  dextrose 50% Injectable 12.5 Gram(s) IV Push once  dextrose 50% Injectable 25 Gram(s) IV Push once  dextrose Oral Gel 15 Gram(s) Oral once PRN  glucagon  Injectable 1 milliGRAM(s) IntraMuscular once  hydrALAZINE 50 milliGRAM(s) Oral three times a day  insulin lispro (ADMELOG) corrective regimen sliding scale   SubCutaneous three times a day before meals  insulin lispro (ADMELOG) corrective regimen sliding scale   SubCutaneous at bedtime  isosorbide   dinitrate Tablet (ISORDIL) 5 milliGRAM(s) Oral three times a day  labetalol 200 milliGRAM(s) Oral every 8 hours  NIFEdipine XL 90 milliGRAM(s) Oral daily  senna 2 Tablet(s) Oral at bedtime  sevelamer carbonate 2400 milliGRAM(s) Oral three times a day  simethicone 80 milliGRAM(s) Chew every 4 hours PRN    A/P:    ESRD on HD TTS  CAD, s/p cath 6/21, s/p PCI to mRCA yesterday   HD yesterday after PCI  [facility]    [phone]